# Patient Record
Sex: MALE | NOT HISPANIC OR LATINO
[De-identification: names, ages, dates, MRNs, and addresses within clinical notes are randomized per-mention and may not be internally consistent; named-entity substitution may affect disease eponyms.]

---

## 2017-04-18 ENCOUNTER — LAB SERVICES (OUTPATIENT)
Dept: OTHER | Age: 52
End: 2017-04-18

## 2017-04-18 ENCOUNTER — CHARTING TRANS (OUTPATIENT)
Dept: OTHER | Age: 52
End: 2017-04-18

## 2017-04-20 LAB — PATH REPORT: NORMAL

## 2017-05-10 ENCOUNTER — OFFICE VISIT (OUTPATIENT)
Dept: FAMILY MEDICINE CLINIC | Facility: CLINIC | Age: 52
End: 2017-05-10

## 2017-05-10 VITALS
BODY MASS INDEX: 38.41 KG/M2 | SYSTOLIC BLOOD PRESSURE: 150 MMHG | RESPIRATION RATE: 16 BRPM | TEMPERATURE: 99 F | HEIGHT: 68.5 IN | HEART RATE: 76 BPM | WEIGHT: 256.38 LBS | DIASTOLIC BLOOD PRESSURE: 98 MMHG

## 2017-05-10 DIAGNOSIS — Z23 NEED FOR PROPHYLACTIC VACCINATION WITH COMBINED DIPHTHERIA-TETANUS-PERTUSSIS (DTP) VACCINE: ICD-10-CM

## 2017-05-10 DIAGNOSIS — Z13.220 SCREENING FOR LIPOID DISORDERS: ICD-10-CM

## 2017-05-10 DIAGNOSIS — Z71.84 TRAVEL ADVICE ENCOUNTER: ICD-10-CM

## 2017-05-10 DIAGNOSIS — Z12.5 SPECIAL SCREENING FOR MALIGNANT NEOPLASM OF PROSTATE: ICD-10-CM

## 2017-05-10 DIAGNOSIS — Z13.228 SCREENING FOR ENDOCRINE, METABOLIC, AND IMMUNITY DISORDER: ICD-10-CM

## 2017-05-10 DIAGNOSIS — R03.0 ELEVATED BLOOD PRESSURE READING: ICD-10-CM

## 2017-05-10 DIAGNOSIS — Z12.11 ENCOUNTER FOR SCREENING COLONOSCOPY: ICD-10-CM

## 2017-05-10 DIAGNOSIS — Z23 NEED FOR HEPATITIS VACCINATION: ICD-10-CM

## 2017-05-10 DIAGNOSIS — Z13.29 SCREENING FOR ENDOCRINE, METABOLIC, AND IMMUNITY DISORDER: ICD-10-CM

## 2017-05-10 DIAGNOSIS — Z00.00 ROUTINE GENERAL MEDICAL EXAMINATION AT A HEALTH CARE FACILITY: Primary | ICD-10-CM

## 2017-05-10 DIAGNOSIS — Z13.0 SCREENING FOR ENDOCRINE, METABOLIC, AND IMMUNITY DISORDER: ICD-10-CM

## 2017-05-10 PROCEDURE — 99396 PREV VISIT EST AGE 40-64: CPT | Performed by: PHYSICIAN ASSISTANT

## 2017-05-10 PROCEDURE — 90471 IMMUNIZATION ADMIN: CPT | Performed by: PHYSICIAN ASSISTANT

## 2017-05-10 PROCEDURE — 90632 HEPA VACCINE ADULT IM: CPT | Performed by: PHYSICIAN ASSISTANT

## 2017-05-10 PROCEDURE — 99202 OFFICE O/P NEW SF 15 MIN: CPT | Performed by: PHYSICIAN ASSISTANT

## 2017-05-10 PROCEDURE — 90472 IMMUNIZATION ADMIN EACH ADD: CPT | Performed by: PHYSICIAN ASSISTANT

## 2017-05-10 PROCEDURE — 90715 TDAP VACCINE 7 YRS/> IM: CPT | Performed by: PHYSICIAN ASSISTANT

## 2017-05-10 RX ORDER — MONTELUKAST SODIUM 10 MG/1
10 TABLET ORAL AS NEEDED
COMMUNITY

## 2017-05-10 RX ORDER — ATOVAQUONE AND PROGUANIL HYDROCHLORIDE 250; 100 MG/1; MG/1
1 TABLET, FILM COATED ORAL DAILY
Qty: 20 TABLET | Refills: 0 | Status: SHIPPED | OUTPATIENT
Start: 2017-05-10 | End: 2017-06-09

## 2017-05-10 RX ORDER — ATOVAQUONE AND PROGUANIL HYDROCHLORIDE 250; 100 MG/1; MG/1
1 TABLET, FILM COATED ORAL DAILY
Qty: 20 TABLET | Refills: 0 | Status: SHIPPED | OUTPATIENT
Start: 2017-05-10 | End: 2017-05-10 | Stop reason: CLARIF

## 2017-05-10 NOTE — PATIENT INSTRUCTIONS
Understanding Malaria  When a mosquito bites you, substances that can cause illness may get into your body through the bite. Some types of mosquitoes can pass on the parasite that causes malaria. Malaria is now rare in the [place]  This is because the [facility] Public health steps are used worldwide to cut down on the number of mosquitoes that can spread the illness. This can be done through chemical spraying or by removing breeding areas.  If you are planning to travel to places where malaria is common, talk with Screening tests and vaccines are an important part of managing your health. Health counseling is essential, too. Below are guidelines for these, for men ages 48 to 59. Talk with your healthcare provider to make sure you’re up-to-date on what you need.   Scr Syphilis Men at increased risk for infection – talk with your healthcare provider At routine exams   Tuberculosis Men at increased risk for infection – talk with your healthcare provider Ask your healthcare provider   Vision All men in this age group Ask y Counseling Who needs it How often   Diet and exercise Men who are overweight or obese When diagnosed, and then at routine exams   Sexually transmitted infection prevention Men at increased risk for infection – talk with your healthcare provider At routine

## 2017-05-10 NOTE — PROGRESS NOTES
Rubi Gunter is a 47year old male who presents for a complete physical exam. He has no complaints. He will be traveling to [facility] in a few weeks and needs some vaccines and malaria prophylaxis. He has a history of HTN but takes nothing for it.    HPI:   Last col exertion; no palpitations, tachycardia, or arrythmias  GI: Denies abdominal pain, constipation or diarrhea; no N/V  : Denies nocturia or changes in stream; no testicular pain, edema, or lumps  Neuro: Denies headaches; dizziness, syncope; no weakness, num vaccination    He was given Hep A #1, and a Tdap. I will review labs. I referred him to Dr Jules Blair for a screening colonoscopy. I ordered malaria prophylaxis and Vivotif to his pharmacy. Return in 2 weeks for a BP check.            Orders Placed This Enc

## 2017-05-24 ENCOUNTER — OFFICE VISIT (OUTPATIENT)
Dept: FAMILY MEDICINE CLINIC | Facility: CLINIC | Age: 52
End: 2017-05-24

## 2017-05-24 VITALS
RESPIRATION RATE: 16 BRPM | TEMPERATURE: 99 F | BODY MASS INDEX: 38.15 KG/M2 | HEART RATE: 66 BPM | SYSTOLIC BLOOD PRESSURE: 140 MMHG | WEIGHT: 254.63 LBS | DIASTOLIC BLOOD PRESSURE: 100 MMHG | HEIGHT: 68.5 IN | OXYGEN SATURATION: 98 %

## 2017-05-24 DIAGNOSIS — I10 ESSENTIAL HYPERTENSION: Primary | ICD-10-CM

## 2017-05-24 DIAGNOSIS — R97.20 ELEVATED PSA, LESS THAN 10 NG/ML: ICD-10-CM

## 2017-05-24 PROCEDURE — 99213 OFFICE O/P EST LOW 20 MIN: CPT | Performed by: PHYSICIAN ASSISTANT

## 2017-05-24 RX ORDER — HEPATITIS B VACCINE (RECOMBINANT) 20 UG/ML
INJECTION, SUSPENSION INTRAMUSCULAR
Refills: 2 | COMMUNITY
Start: 2017-05-15 | End: 2017-07-03 | Stop reason: ALTCHOICE

## 2017-05-24 RX ORDER — LOSARTAN POTASSIUM 50 MG/1
50 TABLET ORAL DAILY
Qty: 30 TABLET | Refills: 1 | Status: SHIPPED | OUTPATIENT
Start: 2017-05-24 | End: 2017-07-03 | Stop reason: ALTCHOICE

## 2017-05-24 NOTE — PROGRESS NOTES
Cameron Tillman is a 47year old male. HPI:   Felicitas Harris presents for hypertension. He has a history of HTN but was not taking anything for it. I noticed it was high 2 weeks ago; he is here for a recheck. He denies chest pain, SOB, dizziness, or palpitations.  He i Essential hypertension  (primary encounter diagnosis)  Elevated psa, less than 10 ng/ml  I explained proper dosing, administration, and side effects of the medication I prescribed.   I discussed his elevated PSA numbers; I am referring him to Dr Inna Alexandre fo · Ask your healthcare provider what weight range is healthiest for you. If you are overweight, a weight loss of only 3% to 5% of your body weight can help lower blood pressure.  Generally, a good weight loss goal is to lose 10% of your body weight in a year

## 2017-07-03 ENCOUNTER — OFFICE VISIT (OUTPATIENT)
Dept: FAMILY MEDICINE CLINIC | Facility: CLINIC | Age: 52
End: 2017-07-03

## 2017-07-03 VITALS
BODY MASS INDEX: 38 KG/M2 | SYSTOLIC BLOOD PRESSURE: 138 MMHG | HEART RATE: 88 BPM | WEIGHT: 252.63 LBS | DIASTOLIC BLOOD PRESSURE: 88 MMHG

## 2017-07-03 DIAGNOSIS — I10 ESSENTIAL HYPERTENSION: Primary | ICD-10-CM

## 2017-07-03 PROCEDURE — 99213 OFFICE O/P EST LOW 20 MIN: CPT | Performed by: PHYSICIAN ASSISTANT

## 2017-07-03 RX ORDER — LOSARTAN POTASSIUM 100 MG/1
100 TABLET ORAL DAILY
Qty: 30 TABLET | Refills: 1 | Status: SHIPPED | OUTPATIENT
Start: 2017-07-03 | End: 2017-08-28 | Stop reason: ALTCHOICE

## 2017-07-03 NOTE — PATIENT INSTRUCTIONS
Exercise for a Healthier Heart  You may wonder how you can improve the health of your heart. If you’re thinking about exercise, you’re on the right track.  You don’t need to become an athlete, but you do need a certain amount of brisk exercise to help str Choose one or more activities you enjoy. Walking is one of the easiest things you can do. You can also try swimming, riding a bike, dancing, or taking an exercise class.   Stop exercising and call your doctor if you:  · Have chest pain or feel dizzy or ligh

## 2017-07-03 NOTE — PROGRESS NOTES
CC:  Patient presents with:  Blood Pressure      HPI: Hair Babin presents for a BP check. He is currently taking losartan 50 mg daily. He does not check his BP at home. He has no complaints.       Social History:  Smoking status: Never Smoker Patient/Caregiver Education: There are no barriers to learning. Medical education done. Outcome: Patient verbalizes understanding. Patient is notified to call with any questions, complications, allergies, or worsening or changing symptoms.   Patient is to c

## 2017-08-28 ENCOUNTER — OFFICE VISIT (OUTPATIENT)
Dept: FAMILY MEDICINE CLINIC | Facility: CLINIC | Age: 52
End: 2017-08-28

## 2017-08-28 VITALS
RESPIRATION RATE: 14 BRPM | SYSTOLIC BLOOD PRESSURE: 134 MMHG | HEIGHT: 68.5 IN | DIASTOLIC BLOOD PRESSURE: 90 MMHG | TEMPERATURE: 99 F | HEART RATE: 76 BPM | BODY MASS INDEX: 38.21 KG/M2 | WEIGHT: 255 LBS

## 2017-08-28 DIAGNOSIS — I10 ESSENTIAL HYPERTENSION: Primary | ICD-10-CM

## 2017-08-28 PROCEDURE — 99213 OFFICE O/P EST LOW 20 MIN: CPT | Performed by: PHYSICIAN ASSISTANT

## 2017-08-28 RX ORDER — HEPATITIS B VACCINE (RECOMBINANT) 20 UG/ML
INJECTION, SUSPENSION INTRAMUSCULAR
Refills: 2 | COMMUNITY
Start: 2017-07-20 | End: 2017-08-28 | Stop reason: ALTCHOICE

## 2017-08-28 RX ORDER — AMLODIPINE AND VALSARTAN 5; 160 MG/1; MG/1
1 TABLET ORAL DAILY
Qty: 30 TABLET | Refills: 1 | Status: SHIPPED | OUTPATIENT
Start: 2017-08-28 | End: 2017-11-20

## 2017-08-28 NOTE — PROGRESS NOTES
Christopher Cochran is a 47year old male. HPI:   Kimberley Garcia presents for hypertension. He has been taking his medications as instructed; no medication side effects. He denies chest pain, SOB, dizziness, or palpitations.     BP Readings from Last 6 Encounters:  08/28/1 administration, and side effects of the medication I prescribed. Rubidennis Gunter will follow up in 4 weeks for a recheck; sooner as needed. No orders of the defined types were placed in this encounter.       Meds & Refills for this Visit:  Signed Prescriptions D

## 2017-09-25 ENCOUNTER — OFFICE VISIT (OUTPATIENT)
Dept: FAMILY MEDICINE CLINIC | Facility: CLINIC | Age: 52
End: 2017-09-25

## 2017-09-25 VITALS
DIASTOLIC BLOOD PRESSURE: 70 MMHG | HEART RATE: 60 BPM | WEIGHT: 257 LBS | HEIGHT: 69 IN | TEMPERATURE: 98 F | RESPIRATION RATE: 16 BRPM | SYSTOLIC BLOOD PRESSURE: 110 MMHG | BODY MASS INDEX: 38.06 KG/M2

## 2017-09-25 DIAGNOSIS — I10 ESSENTIAL HYPERTENSION: Primary | ICD-10-CM

## 2017-09-25 PROCEDURE — 99213 OFFICE O/P EST LOW 20 MIN: CPT | Performed by: PHYSICIAN ASSISTANT

## 2017-09-25 NOTE — PROGRESS NOTES
Odell Lucero is a 47year old male. HPI:   Mau Radha presents for hypertension. He has been taking his medications as instructed; no medication side effects. Home BP monitoring: none. He denies chest pain, SOB, dizziness, or palpitations.     BP Readings from hypertension  (primary encounter diagnosis)  I encouraged Ana Wyatt to continue working to lose weight. Ana Wyatt will follow up in 3 months for his regular visit; sooner as needed. We can extend him out to every 6 months if BP is controlled in 3 months.   No orders

## 2017-11-21 RX ORDER — AMLODIPINE AND VALSARTAN 5; 160 MG/1; MG/1
TABLET ORAL
Qty: 30 TABLET | Refills: 0 | Status: SHIPPED | OUTPATIENT
Start: 2017-11-21 | End: 2017-12-21

## 2017-11-21 NOTE — TELEPHONE ENCOUNTER
Medication refilled per protocol.        Signed Prescriptions Disp Refills    AMLODIPINE BESYLATE-VALSARTAN 5-160 MG Oral Tab 30 tablet 0      Sig: TAKE 1 TABLET BY MOUTH ONE TIME A DAY         Authorizing Provider: Chiquis Siddiqui        Ordering User: HELADIO

## 2017-12-21 ENCOUNTER — OFFICE VISIT (OUTPATIENT)
Dept: FAMILY MEDICINE CLINIC | Facility: CLINIC | Age: 52
End: 2017-12-21

## 2017-12-21 VITALS
BODY MASS INDEX: 38.26 KG/M2 | RESPIRATION RATE: 16 BRPM | SYSTOLIC BLOOD PRESSURE: 122 MMHG | WEIGHT: 255.38 LBS | TEMPERATURE: 98 F | HEART RATE: 72 BPM | HEIGHT: 68.5 IN | DIASTOLIC BLOOD PRESSURE: 88 MMHG

## 2017-12-21 DIAGNOSIS — I10 ESSENTIAL HYPERTENSION: Primary | ICD-10-CM

## 2017-12-21 PROCEDURE — 99213 OFFICE O/P EST LOW 20 MIN: CPT | Performed by: PHYSICIAN ASSISTANT

## 2017-12-21 RX ORDER — AMLODIPINE AND VALSARTAN 5; 160 MG/1; MG/1
TABLET ORAL
Qty: 90 TABLET | Refills: 1 | Status: SHIPPED | OUTPATIENT
Start: 2017-12-21 | End: 2018-06-18

## 2017-12-21 NOTE — PROGRESS NOTES
Robbyluis miguel Valverde is a 47year old male. HPI:   Rubi Gunter presents for hypertension. He has been taking his medications as instructed; no medication side effects. Home BP monitoring: not mentioned. He denies chest pain, SOB, dizziness, or palpitations.     BP Readi process    ASSESSMENT AND PLAN:   Essential hypertension  (primary encounter diagnosis)    Eze Perez will follow up in 6 months for his regular visit; sooner as needed. No orders of the defined types were placed in this encounter.       Meds & Refills for [facility]

## 2018-01-19 LAB
AMB EXT CHOL/HDL RATIO: 4.4
AMB EXT CHOLESTEROL, TOTAL: 153 MG/DL
AMB EXT CREATININE: 1.15 MG/DL
AMB EXT GLUCOSE: 103 MG/DL
AMB EXT HDL CHOLESTEROL: 35 MG/DL
AMB EXT HEMATOCRIT: 50.9
AMB EXT HEMOGLOBIN: 17.1
AMB EXT LDL CHOLESTEROL, DIRECT: 79 MG/DL
AMB EXT MCV: 86
AMB EXT PLATELETS: 210
AMB EXT TRIGLYCERIDES: 193 MG/DL
AMB EXT WBC: 5.9 X10(3)UL

## 2018-06-18 RX ORDER — AMLODIPINE AND VALSARTAN 5; 160 MG/1; MG/1
TABLET ORAL
Qty: 90 TABLET | Refills: 0 | Status: SHIPPED | OUTPATIENT
Start: 2018-06-18 | End: 2018-06-25

## 2018-06-25 ENCOUNTER — OFFICE VISIT (OUTPATIENT)
Dept: FAMILY MEDICINE CLINIC | Facility: CLINIC | Age: 53
End: 2018-06-25

## 2018-06-25 VITALS
DIASTOLIC BLOOD PRESSURE: 68 MMHG | BODY MASS INDEX: 38.21 KG/M2 | HEIGHT: 68.5 IN | HEART RATE: 76 BPM | TEMPERATURE: 98 F | RESPIRATION RATE: 14 BRPM | SYSTOLIC BLOOD PRESSURE: 126 MMHG | WEIGHT: 255 LBS

## 2018-06-25 DIAGNOSIS — Z00.00 ANNUAL PHYSICAL EXAM: Primary | ICD-10-CM

## 2018-06-25 DIAGNOSIS — I10 ESSENTIAL HYPERTENSION: ICD-10-CM

## 2018-06-25 DIAGNOSIS — Z12.11 COLON CANCER SCREENING: ICD-10-CM

## 2018-06-25 DIAGNOSIS — R97.20 ELEVATED PSA, LESS THAN 10 NG/ML: ICD-10-CM

## 2018-06-25 PROCEDURE — 99396 PREV VISIT EST AGE 40-64: CPT | Performed by: FAMILY MEDICINE

## 2018-06-25 RX ORDER — AZITHROMYCIN 250 MG/1
TABLET, FILM COATED ORAL
Qty: 6 TABLET | Refills: 0 | Status: SHIPPED | OUTPATIENT
Start: 2018-06-25 | End: 2018-12-10 | Stop reason: ALTCHOICE

## 2018-06-25 RX ORDER — AMLODIPINE AND VALSARTAN 5; 160 MG/1; MG/1
TABLET ORAL
Qty: 90 TABLET | Refills: 1 | Status: SHIPPED | OUTPATIENT
Start: 2018-06-25 | End: 2018-11-21

## 2018-06-25 NOTE — PROGRESS NOTES
Patient presents with:  Hypertension: 6 month f/u      HPI:   Hollie Smith is a 46year old male who presents for a complete physical exam.     Last colonoscopy:  Never had. No issues with bowels. Interested in c-scope alternative.    Last PSA:  4.4 a year ago No past medical history on file.    Past Surgical History:  1/1/1975: TONSILLECTOMY      Comment: with adenoidectomy   Family History   Problem Relation Age of Onset   • Neurological Disorder Sister      MS   • Neurological Disorder Mother      jeanmarie need to work on healthy diet, weight  Need c-scope  In past PSA elevated, but no symptoms. Immun UTD. 2. Colon cancer screening  Cologuard ordered  - GASTRO - INTERNAL    3.  Essential hypertension  BP controlled  Goal to get off meds  Watch [employer]

## 2018-07-27 LAB — AMB EXT COLOGUARD RESULT: NEGATIVE

## 2018-08-06 ENCOUNTER — TELEPHONE (OUTPATIENT)
Dept: FAMILY MEDICINE CLINIC | Facility: CLINIC | Age: 53
End: 2018-08-06

## 2018-08-06 ENCOUNTER — MED REC SCAN ONLY (OUTPATIENT)
Dept: FAMILY MEDICINE CLINIC | Facility: CLINIC | Age: 53
End: 2018-08-06

## 2018-11-21 ENCOUNTER — TELEPHONE (OUTPATIENT)
Dept: FAMILY MEDICINE CLINIC | Facility: CLINIC | Age: 53
End: 2018-11-21

## 2018-11-21 RX ORDER — AMLODIPINE BESYLATE AND BENAZEPRIL HYDROCHLORIDE 10; 20 MG/1; MG/1
1 CAPSULE ORAL DAILY
Qty: 90 CAPSULE | Refills: 1 | Status: SHIPPED | OUTPATIENT
Start: 2018-11-21 | End: 2019-05-02

## 2018-11-21 NOTE — TELEPHONE ENCOUNTER
Pharmacy is calling patient is still on the Amlodipine Besylate-Valsartan and she told him to stop and is calling for us to prescribe a new medication. Please call into [facility] as soon as possible.

## 2018-11-21 NOTE — TELEPHONE ENCOUNTER
Yes, was taking the amlodip]ine-valsartan 5-160 combo  Will change to another amlodipine combo, shoud be just as effective.

## 2018-11-21 NOTE — TELEPHONE ENCOUNTER
Gayle Armstrong 25 was notified last night that Pt is BP med Amlodipine-Besylate-Valsartan ans been put on a Class 1 recall. Pharmacy started calling Pt's last night to bring medication back to them to be cuaranteen.  Pt will need new BP med ASAP sent to [facility]

## 2018-12-10 ENCOUNTER — OFFICE VISIT (OUTPATIENT)
Dept: FAMILY MEDICINE CLINIC | Facility: CLINIC | Age: 53
End: 2018-12-10
Payer: COMMERCIAL

## 2018-12-10 VITALS
RESPIRATION RATE: 16 BRPM | TEMPERATURE: 98 F | SYSTOLIC BLOOD PRESSURE: 120 MMHG | BODY MASS INDEX: 36.77 KG/M2 | DIASTOLIC BLOOD PRESSURE: 70 MMHG | HEART RATE: 70 BPM | WEIGHT: 254 LBS | HEIGHT: 69.5 IN

## 2018-12-10 DIAGNOSIS — Z00.00 LABORATORY EXAM ORDERED AS PART OF ROUTINE GENERAL MEDICAL EXAMINATION: ICD-10-CM

## 2018-12-10 DIAGNOSIS — I10 ESSENTIAL HYPERTENSION: Primary | ICD-10-CM

## 2018-12-10 PROCEDURE — 99214 OFFICE O/P EST MOD 30 MIN: CPT | Performed by: FAMILY MEDICINE

## 2018-12-10 NOTE — PROGRESS NOTES
Patient presents with:  Blood Pressure: F/u     HPI:   Baldomero Solis is a 46year old male who presents to the office for BP check up. Goal is still to get off meds  On amlodipine-benazepril 10-20mg. BP controlled.     Stress levels are a little higher, most

## 2019-05-01 ENCOUNTER — TELEPHONE (OUTPATIENT)
Dept: FAMILY MEDICINE CLINIC | Facility: CLINIC | Age: 54
End: 2019-05-01

## 2019-05-01 NOTE — TELEPHONE ENCOUNTER
Patient called for requesting refill for his blood pressure medication, 90 day supply, and is currently out of the medication, states pharmacy sent over request as well

## 2019-05-02 RX ORDER — BENZONATATE 100 MG/1
CAPSULE ORAL
Refills: 0 | COMMUNITY
Start: 2019-01-18 | End: 2019-08-19

## 2019-05-02 RX ORDER — AMLODIPINE BESYLATE AND BENAZEPRIL HYDROCHLORIDE 10; 20 MG/1; MG/1
1 CAPSULE ORAL DAILY
Qty: 90 CAPSULE | Refills: 1 | Status: SHIPPED | OUTPATIENT
Start: 2019-05-02 | End: 2019-10-25

## 2019-05-28 ENCOUNTER — TELEPHONE (OUTPATIENT)
Dept: FAMILY MEDICINE CLINIC | Facility: CLINIC | Age: 54
End: 2019-05-28

## 2019-05-28 NOTE — TELEPHONE ENCOUNTER
Patient will be traveling to [place] [facility] at the [facility] 6/13 for 2 weeks and spouse is requesting malaria medication to  Be sen to [facility]. Please contact spouse.

## 2019-05-29 NOTE — TELEPHONE ENCOUNTER
Is he able to schedule annual physical?  We can goover the travel recommendations and some of the important points on malaria prevention

## 2019-06-03 ENCOUNTER — OFFICE VISIT (OUTPATIENT)
Dept: FAMILY MEDICINE CLINIC | Facility: CLINIC | Age: 54
End: 2019-06-03
Payer: COMMERCIAL

## 2019-06-03 VITALS
HEIGHT: 68.5 IN | BODY MASS INDEX: 37.6 KG/M2 | HEART RATE: 84 BPM | WEIGHT: 251 LBS | SYSTOLIC BLOOD PRESSURE: 110 MMHG | DIASTOLIC BLOOD PRESSURE: 70 MMHG | TEMPERATURE: 98 F | RESPIRATION RATE: 16 BRPM

## 2019-06-03 DIAGNOSIS — R97.20 ELEVATED PSA, LESS THAN 10 NG/ML: ICD-10-CM

## 2019-06-03 DIAGNOSIS — Z00.00 ANNUAL PHYSICAL EXAM: Primary | ICD-10-CM

## 2019-06-03 DIAGNOSIS — I10 ESSENTIAL HYPERTENSION: ICD-10-CM

## 2019-06-03 DIAGNOSIS — Z71.84 TRAVEL ADVICE ENCOUNTER: ICD-10-CM

## 2019-06-03 PROCEDURE — 99396 PREV VISIT EST AGE 40-64: CPT | Performed by: FAMILY MEDICINE

## 2019-06-03 RX ORDER — ATOVAQUONE AND PROGUANIL HYDROCHLORIDE 250; 100 MG/1; MG/1
1 TABLET, FILM COATED ORAL DAILY
Qty: 14 TABLET | Refills: 0 | Status: SHIPPED | OUTPATIENT
Start: 2019-06-03 | End: 2019-06-17

## 2019-06-03 NOTE — PROGRESS NOTES
Patient presents with:  Physical: annual physical, no labs, not fasting     HPI:   Chaka Hackett is a 53year old male who presents for a complete physical exam.     Last colonoscopy:  Normal Cologuard 7/2018.   Repeat 3 yrs - 2021  Last PSA:  Elevated in past. needed. Disp:  Rfl:       No past medical history on file.    Past Surgical History:   Procedure Laterality Date   • TONSILLECTOMY  1/1/1975    with adenoidectomy      Family History   Problem Relation Age of Onset   • Neurological Disorder Sister         Angelica Bautista had concerns including Physical (annual physical, no labs, not fasting). 1. Annual physical exam  Overall doing well  Some improvements in diet  Continue to try weigh loss efforts. Diet and exercise  c-scope - [place] in 2018 wnl. . Repeat 2021.   [place]

## 2019-08-19 ENCOUNTER — OFFICE VISIT (OUTPATIENT)
Dept: FAMILY MEDICINE CLINIC | Facility: CLINIC | Age: 54
End: 2019-08-19
Payer: COMMERCIAL

## 2019-08-19 VITALS
SYSTOLIC BLOOD PRESSURE: 104 MMHG | HEART RATE: 84 BPM | BODY MASS INDEX: 37.77 KG/M2 | TEMPERATURE: 98 F | RESPIRATION RATE: 16 BRPM | WEIGHT: 255 LBS | DIASTOLIC BLOOD PRESSURE: 64 MMHG | HEIGHT: 69 IN

## 2019-08-19 DIAGNOSIS — L03.116 LEFT LEG CELLULITIS: ICD-10-CM

## 2019-08-19 DIAGNOSIS — T78.40XA ALLERGIC REACTION TO DRUG, INITIAL ENCOUNTER: Primary | ICD-10-CM

## 2019-08-19 PROCEDURE — 99214 OFFICE O/P EST MOD 30 MIN: CPT | Performed by: FAMILY MEDICINE

## 2019-08-19 RX ORDER — CLINDAMYCIN HYDROCHLORIDE 300 MG/1
300 CAPSULE ORAL
COMMUNITY
Start: 2019-08-13 | End: 2019-08-23

## 2019-08-19 NOTE — PROGRESS NOTES
Patient presents with:  Derm Problem: follow up on cellulitis on left leg/foot, improving gradually, still taking rx     HPI:   Terrence Cheung is a 53year old male who presents to the office for L leg cellulitis f/u. August 7th went to [facility] but it was closed. including Derm Problem (follow up on cellulitis on left leg/foot, improving gradually, still taking rx). 1. Allergic reaction to drug, initial encounter  Possible sulfa? Will send to allergist to help differentiate sulfa vs cephalosporin rxn.    Use col

## 2019-10-30 RX ORDER — AMLODIPINE BESYLATE AND BENAZEPRIL HYDROCHLORIDE 10; 20 MG/1; MG/1
CAPSULE ORAL
Qty: 90 CAPSULE | Refills: 1 | Status: SHIPPED | OUTPATIENT
Start: 2019-10-30 | End: 2020-04-22

## 2019-10-30 NOTE — TELEPHONE ENCOUNTER
Requested Prescriptions     Pending Prescriptions Disp Refills   • AMLODIPINE BESY-BENAZEPRIL HCL 10-20 MG Oral Cap [Pharmacy Med Name: AMLODIPINE-BENAZEPRIL 10-20 MG] 90 capsule 1     Sig: TAKE 1 CAPSULE BY MOUTH EVERY DAY     LOV 8/19/2019     Patient wa

## 2020-04-22 RX ORDER — AMLODIPINE BESYLATE AND BENAZEPRIL HYDROCHLORIDE 10; 20 MG/1; MG/1
CAPSULE ORAL
Qty: 90 CAPSULE | Refills: 1 | Status: SHIPPED | OUTPATIENT
Start: 2020-04-22 | End: 2020-11-06

## 2020-11-06 ENCOUNTER — TELEPHONE (OUTPATIENT)
Dept: FAMILY MEDICINE CLINIC | Facility: CLINIC | Age: 55
End: 2020-11-06

## 2020-11-06 DIAGNOSIS — R97.20 ELEVATED PSA, LESS THAN 10 NG/ML: ICD-10-CM

## 2020-11-06 DIAGNOSIS — I10 ESSENTIAL HYPERTENSION: Primary | ICD-10-CM

## 2020-11-06 RX ORDER — AMLODIPINE BESYLATE AND BENAZEPRIL HYDROCHLORIDE 10; 20 MG/1; MG/1
1 CAPSULE ORAL DAILY
Qty: 90 CAPSULE | Refills: 0 | Status: SHIPPED | OUTPATIENT
Start: 2020-11-06 | End: 2020-12-07

## 2020-11-06 NOTE — TELEPHONE ENCOUNTER
Please enter lab orders for the patient's upcoming physical appointment. Physical scheduled:    Your appointments     Date & Time Appointment Department [facility])    Dec 07, 2020  8:00 AM CST Physical - Established with Cresencio Braswell MD [address] [place] [address]

## 2020-11-06 NOTE — TELEPHONE ENCOUNTER
Pt called looking for status on amlopidine medication.  Pt is out of medication and was not able to schedule his physical until 12/7

## 2020-12-07 ENCOUNTER — OFFICE VISIT (OUTPATIENT)
Dept: FAMILY MEDICINE CLINIC | Facility: CLINIC | Age: 55
End: 2020-12-07
Payer: COMMERCIAL

## 2020-12-07 VITALS
WEIGHT: 254.38 LBS | HEART RATE: 84 BPM | DIASTOLIC BLOOD PRESSURE: 68 MMHG | BODY MASS INDEX: 38.11 KG/M2 | RESPIRATION RATE: 16 BRPM | SYSTOLIC BLOOD PRESSURE: 118 MMHG | TEMPERATURE: 97 F | HEIGHT: 68.5 IN

## 2020-12-07 DIAGNOSIS — Z00.00 ANNUAL PHYSICAL EXAM: Primary | ICD-10-CM

## 2020-12-07 DIAGNOSIS — I10 ESSENTIAL HYPERTENSION: ICD-10-CM

## 2020-12-07 DIAGNOSIS — R97.20 ELEVATED PSA, LESS THAN 10 NG/ML: ICD-10-CM

## 2020-12-07 DIAGNOSIS — Z12.11 COLON CANCER SCREENING: ICD-10-CM

## 2020-12-07 PROCEDURE — 3078F DIAST BP <80 MM HG: CPT | Performed by: FAMILY MEDICINE

## 2020-12-07 PROCEDURE — 99396 PREV VISIT EST AGE 40-64: CPT | Performed by: FAMILY MEDICINE

## 2020-12-07 PROCEDURE — 3008F BODY MASS INDEX DOCD: CPT | Performed by: FAMILY MEDICINE

## 2020-12-07 PROCEDURE — 3074F SYST BP LT 130 MM HG: CPT | Performed by: FAMILY MEDICINE

## 2020-12-07 RX ORDER — AMLODIPINE BESYLATE AND BENAZEPRIL HYDROCHLORIDE 10; 20 MG/1; MG/1
1 CAPSULE ORAL DAILY
Qty: 90 CAPSULE | Refills: 3 | Status: SHIPPED | OUTPATIENT
Start: 2020-12-07 | End: 2022-02-07

## 2020-12-07 NOTE — PROGRESS NOTES
Patient presents with:  Physical: annual     HPI:   Deya Huff is a 63year old male who presents for a complete physical exam.     Last colonoscopy:  Overdue. Cologuard years ago.     Last PSA:  Remains elevated, but improved from last check.  4.2.  +night history.    Past Surgical History:   Procedure Laterality Date   • TONSILLECTOMY  1/1/1975    with adenoidectomy      Family History   Problem Relation Age of Onset   • Neurological Disorder Sister         MS   • Neurological Disorder Mother         jeanmarie reflexes. Normal coordination and gait     ASSESSMENT AND PLAN:     Sarah Preston was seen in the office today:  had concerns including Physical (annual). 1. Annual physical exam  Overall well  Weight stable  BP controlled  Due for c-scope. Immun UTD.    Wo

## 2021-10-27 ENCOUNTER — TELEPHONE (OUTPATIENT)
Dept: FAMILY MEDICINE CLINIC | Facility: CLINIC | Age: 56
End: 2021-10-27

## 2021-10-27 NOTE — TELEPHONE ENCOUNTER
Pt has had a lingering cough for a month it started out as sinus but it has changed to this cough and he has a little bit of congestion and phlegm and wants to make sure it doesn't turn into something.

## 2021-10-27 NOTE — TELEPHONE ENCOUNTER
One month ago had symptoms had covid test then it was neg he has had congestion with cough and now feels this has settled in his chest willing to do video visit if possible .

## 2021-10-27 NOTE — TELEPHONE ENCOUNTER
Called LMOM to call back if he has not been tested for covid needs to go to urgent care to be tested and examined.

## 2021-10-27 NOTE — TELEPHONE ENCOUNTER
Called patient and left message on machine to contact office and schedule in office appointment at 12-12:30 with Lois Kocher per Sarah Tyalor.

## 2021-10-28 ENCOUNTER — OFFICE VISIT (OUTPATIENT)
Dept: FAMILY MEDICINE CLINIC | Facility: CLINIC | Age: 56
End: 2021-10-28
Payer: COMMERCIAL

## 2021-10-28 VITALS
SYSTOLIC BLOOD PRESSURE: 104 MMHG | RESPIRATION RATE: 16 BRPM | TEMPERATURE: 97 F | HEART RATE: 80 BPM | HEIGHT: 68.2 IN | WEIGHT: 244 LBS | DIASTOLIC BLOOD PRESSURE: 60 MMHG | BODY MASS INDEX: 36.98 KG/M2

## 2021-10-28 DIAGNOSIS — R05.3 PERSISTENT COUGH: Primary | ICD-10-CM

## 2021-10-28 PROCEDURE — 3074F SYST BP LT 130 MM HG: CPT | Performed by: PHYSICIAN ASSISTANT

## 2021-10-28 PROCEDURE — 99213 OFFICE O/P EST LOW 20 MIN: CPT | Performed by: PHYSICIAN ASSISTANT

## 2021-10-28 PROCEDURE — 3078F DIAST BP <80 MM HG: CPT | Performed by: PHYSICIAN ASSISTANT

## 2021-10-28 PROCEDURE — 3008F BODY MASS INDEX DOCD: CPT | Performed by: PHYSICIAN ASSISTANT

## 2021-10-28 RX ORDER — ALBUTEROL SULFATE 90 UG/1
2 AEROSOL, METERED RESPIRATORY (INHALATION) EVERY 6 HOURS PRN
Qty: 6.7 G | Refills: 0 | Status: SHIPPED | OUTPATIENT
Start: 2021-10-28 | End: 2021-11-17

## 2021-10-28 RX ORDER — PREDNISONE 20 MG/1
TABLET ORAL
Qty: 8 TABLET | Refills: 0 | Status: SHIPPED | OUTPATIENT
Start: 2021-10-28

## 2021-10-28 RX ORDER — CODEINE PHOSPHATE AND GUAIFENESIN 10; 100 MG/5ML; MG/5ML
5 SOLUTION ORAL EVERY 6 HOURS PRN
Qty: 118 ML | Refills: 0 | Status: SHIPPED | OUTPATIENT
Start: 2021-10-28

## 2021-10-28 NOTE — PROGRESS NOTES
Patient presents with:  Cough: x3 weeks        HISTORY OF PRESENT ILLNESS  Patricio Serra is a 61year old male who presents for evaluation of cough. Started as a cold, turned into laryngitis for 10 days and then seemed to move to chest. No fevers.  Cough persis Normal effort on room air. Clear to auscultation bilaterally. No adventitious breath sounds appreciated. ASSESSMENT/ PLAN  1.  Persistent cough  Discussed the concern that the patient had a mild case of COVID 19 with other family members testing positive

## 2021-11-17 RX ORDER — ALBUTEROL SULFATE 90 UG/1
AEROSOL, METERED RESPIRATORY (INHALATION)
Qty: 6.7 EACH | Refills: 0 | Status: SHIPPED | OUTPATIENT
Start: 2021-11-17

## 2021-11-17 NOTE — TELEPHONE ENCOUNTER
Requested Prescriptions     Pending Prescriptions Disp Refills   • ALBUTEROL 108 (90 Base) MCG/ACT Inhalation Aero Soln [Pharmacy Med Name: ALBUTEROL HFA (PROVENTIL) INH] 6.7 each 0     Sig: TAKE 2 PUFFS BY MOUTH EVERY 6 HOURS AS NEEDED FOR WHEEZE OR SHORT

## 2022-02-05 ENCOUNTER — TELEPHONE (OUTPATIENT)
Dept: FAMILY MEDICINE CLINIC | Facility: CLINIC | Age: 57
End: 2022-02-05

## 2022-02-05 NOTE — TELEPHONE ENCOUNTER
Pt has scheduled physical for 2/25/22. Please send enough medication (amlodipine) to last until then. Pt is out of said mediation.

## 2022-02-05 NOTE — TELEPHONE ENCOUNTER
Please enter lab orders for the patient's upcoming physical appointment. Physical scheduled: Your appointments     Date & Time Appointment Department [facility])    Feb 25, 2022  9:30 AM CST Physical - Established with Néstor Casarez MD [facility], [address], [place]mit[place]y Peggy  ([address])            [place] [address] [phone]         Preferred lab: [facility]     The patient has been notified to complete fasting labs prior to their physical appointment.

## 2022-02-07 RX ORDER — AMLODIPINE BESYLATE AND BENAZEPRIL HYDROCHLORIDE 10; 20 MG/1; MG/1
CAPSULE ORAL
Qty: 90 CAPSULE | Refills: 0 | Status: SHIPPED | OUTPATIENT
Start: 2022-02-07

## 2022-02-09 LAB
% FREE PSA: 12 % (CALC)
ALBUMIN/GLOBULIN RATIO: 1.7 (CALC) (ref 1–2.5)
ALBUMIN: 4.5 G/DL (ref 3.6–5.1)
ALKALINE PHOSPHATASE: 74 U/L (ref 35–144)
ALT: 24 U/L (ref 9–46)
AST: 15 U/L (ref 10–35)
BILIRUBIN, TOTAL: 0.9 MG/DL (ref 0.2–1.2)
BUN: 16 MG/DL (ref 7–25)
CALCIUM: 9.7 MG/DL (ref 8.6–10.3)
CARBON DIOXIDE: 28 MMOL/L (ref 20–32)
CHLORIDE: 104 MMOL/L (ref 98–110)
CHOL/HDLC RATIO: 3 (CALC)
CHOLESTEROL, TOTAL: 145 MG/DL
CREATININE: 1.02 MG/DL (ref 0.7–1.33)
EGFR IF AFRICN AM: 94 ML/MIN/1.73M2
EGFR IF NONAFRICN AM: 81 ML/MIN/1.73M2
FREE PSA: 0.6 NG/ML
GLOBULIN: 2.6 G/DL (CALC) (ref 1.9–3.7)
GLUCOSE: 93 MG/DL (ref 65–99)
HDL CHOLESTEROL: 48 MG/DL
LDL-CHOLESTEROL: 79 MG/DL (CALC)
NON-HDL CHOLESTEROL: 97 MG/DL (CALC)
POTASSIUM: 4.3 MMOL/L (ref 3.5–5.3)
SODIUM: 141 MMOL/L (ref 135–146)
TOTAL PSA: 5.2 NG/ML
TRIGLYCERIDES: 96 MG/DL

## 2022-02-25 ENCOUNTER — OFFICE VISIT (OUTPATIENT)
Dept: FAMILY MEDICINE CLINIC | Facility: CLINIC | Age: 57
End: 2022-02-25
Payer: COMMERCIAL

## 2022-02-25 VITALS
RESPIRATION RATE: 16 BRPM | TEMPERATURE: 97 F | OXYGEN SATURATION: 98 % | HEART RATE: 76 BPM | HEIGHT: 68.5 IN | SYSTOLIC BLOOD PRESSURE: 106 MMHG | DIASTOLIC BLOOD PRESSURE: 60 MMHG | WEIGHT: 252.19 LBS | BODY MASS INDEX: 37.78 KG/M2

## 2022-02-25 DIAGNOSIS — I10 ESSENTIAL HYPERTENSION: ICD-10-CM

## 2022-02-25 DIAGNOSIS — R97.20 ELEVATED PSA, LESS THAN 10 NG/ML: ICD-10-CM

## 2022-02-25 DIAGNOSIS — Z00.00 ANNUAL PHYSICAL EXAM: Primary | ICD-10-CM

## 2022-02-25 DIAGNOSIS — Z12.11 COLON CANCER SCREENING: ICD-10-CM

## 2022-02-25 PROCEDURE — 99396 PREV VISIT EST AGE 40-64: CPT | Performed by: FAMILY MEDICINE

## 2022-02-25 PROCEDURE — 3078F DIAST BP <80 MM HG: CPT | Performed by: FAMILY MEDICINE

## 2022-02-25 PROCEDURE — 3074F SYST BP LT 130 MM HG: CPT | Performed by: FAMILY MEDICINE

## 2022-02-25 PROCEDURE — 3008F BODY MASS INDEX DOCD: CPT | Performed by: FAMILY MEDICINE

## 2022-04-05 LAB
% FREE PSA: 13 % (CALC)
FREE PSA: 0.6 NG/ML
TOTAL PSA: 4.7 NG/ML

## 2022-05-13 RX ORDER — AMLODIPINE BESYLATE AND BENAZEPRIL HYDROCHLORIDE 10; 20 MG/1; MG/1
CAPSULE ORAL
Qty: 90 CAPSULE | Refills: 0 | Status: SHIPPED | OUTPATIENT
Start: 2022-05-13

## 2022-06-29 PROBLEM — K63.5 COLON POLYP: Status: ACTIVE | Noted: 2022-06-29

## 2022-06-29 PROBLEM — Z12.11 SPECIAL SCREENING FOR MALIGNANT NEOPLASMS, COLON: Status: ACTIVE | Noted: 2022-06-29

## 2022-08-02 DIAGNOSIS — I10 ESSENTIAL HYPERTENSION: ICD-10-CM

## 2022-08-02 RX ORDER — AMLODIPINE BESYLATE AND BENAZEPRIL HYDROCHLORIDE 10; 20 MG/1; MG/1
CAPSULE ORAL
Qty: 90 CAPSULE | Refills: 0 | Status: SHIPPED | OUTPATIENT
Start: 2022-08-02

## 2022-11-09 DIAGNOSIS — I10 ESSENTIAL HYPERTENSION: ICD-10-CM

## 2022-11-09 RX ORDER — AMLODIPINE BESYLATE AND BENAZEPRIL HYDROCHLORIDE 10; 20 MG/1; MG/1
CAPSULE ORAL
Qty: 90 CAPSULE | Refills: 0 | Status: SHIPPED | OUTPATIENT
Start: 2022-11-09

## 2023-02-19 DIAGNOSIS — I10 ESSENTIAL HYPERTENSION: ICD-10-CM

## 2023-02-27 DIAGNOSIS — I10 ESSENTIAL HYPERTENSION: ICD-10-CM

## 2023-03-01 RX ORDER — AMLODIPINE BESYLATE AND BENAZEPRIL HYDROCHLORIDE 10; 20 MG/1; MG/1
1 CAPSULE ORAL DAILY
Qty: 90 CAPSULE | Refills: 0 | Status: SHIPPED | OUTPATIENT
Start: 2023-03-01

## 2023-03-08 ENCOUNTER — TELEPHONE (OUTPATIENT)
Dept: FAMILY MEDICINE CLINIC | Facility: CLINIC | Age: 58
End: 2023-03-08

## 2023-03-08 DIAGNOSIS — R97.20 ELEVATED PSA, LESS THAN 10 NG/ML: ICD-10-CM

## 2023-03-08 DIAGNOSIS — Z13.220 LIPID SCREENING: ICD-10-CM

## 2023-03-08 DIAGNOSIS — I10 ESSENTIAL HYPERTENSION: Primary | ICD-10-CM

## 2023-03-08 NOTE — TELEPHONE ENCOUNTER
Please enter lab orders for the patient's upcoming physical appointment. Physical scheduled: Your appointments     Date & Time Appointment Department [facility])    Apr 18, 2023  8:00 AM CDT Adult Physical with Rita Betancur MD [address] [place] [address] ([address] [facility] [address])    PLEASE NOTE - Most insurances allow a Complete Physical once every 366 days. Please schedule accordingly. Please arrive 15 minutes prior to your scheduled appointment. Please also bring your Insurance card, Photo ID, and your medication bottles or a list of your current medication. If you no longer require this appointment, please contact your physician office to cancel. Gurpreet Hui [address] [facility] [phone]         Preferred lab: [facility]     The patient has been notified to complete fasting labs prior to their physical appointment.

## 2023-03-16 RX ORDER — AMLODIPINE BESYLATE AND BENAZEPRIL HYDROCHLORIDE 10; 20 MG/1; MG/1
CAPSULE ORAL
Qty: 30 CAPSULE | Refills: 2 | OUTPATIENT
Start: 2023-03-16

## 2023-04-14 LAB
% FREE PSA: 11 % (CALC)
ALBUMIN/GLOBULIN RATIO: 1.8 (CALC) (ref 1–2.5)
ALBUMIN: 4.3 G/DL (ref 3.6–5.1)
ALKALINE PHOSPHATASE: 66 U/L (ref 35–144)
ALT: 25 U/L (ref 9–46)
AST: 19 U/L (ref 10–35)
BILIRUBIN, TOTAL: 1 MG/DL (ref 0.2–1.2)
BUN: 14 MG/DL (ref 7–25)
CALCIUM: 9.4 MG/DL (ref 8.6–10.3)
CARBON DIOXIDE: 25 MMOL/L (ref 20–32)
CHLORIDE: 105 MMOL/L (ref 98–110)
CHOL/HDLC RATIO: 3.4 (CALC)
CHOLESTEROL, TOTAL: 151 MG/DL
CREATININE: 1.1 MG/DL (ref 0.7–1.3)
EGFR: 77 ML/MIN/1.73M2
FREE PSA: 0.6 NG/ML
GLOBULIN: 2.4 G/DL (CALC) (ref 1.9–3.7)
GLUCOSE: 99 MG/DL (ref 65–99)
HDL CHOLESTEROL: 44 MG/DL
LDL-CHOLESTEROL: 88 MG/DL (CALC)
NON-HDL CHOLESTEROL: 107 MG/DL (CALC)
POTASSIUM: 4.4 MMOL/L (ref 3.5–5.3)
PROTEIN, TOTAL: 6.7 G/DL (ref 6.1–8.1)
SODIUM: 139 MMOL/L (ref 135–146)
TOTAL PSA: 5.3 NG/ML
TRIGLYCERIDES: 95 MG/DL

## 2023-04-18 ENCOUNTER — OFFICE VISIT (OUTPATIENT)
Dept: FAMILY MEDICINE CLINIC | Facility: CLINIC | Age: 58
End: 2023-04-18
Payer: COMMERCIAL

## 2023-04-18 VITALS
HEART RATE: 70 BPM | SYSTOLIC BLOOD PRESSURE: 128 MMHG | WEIGHT: 262 LBS | DIASTOLIC BLOOD PRESSURE: 80 MMHG | BODY MASS INDEX: 39.25 KG/M2 | RESPIRATION RATE: 16 BRPM | HEIGHT: 68.5 IN

## 2023-04-18 DIAGNOSIS — R23.9 SKIN CHANGE: ICD-10-CM

## 2023-04-18 DIAGNOSIS — I10 ESSENTIAL HYPERTENSION: ICD-10-CM

## 2023-04-18 DIAGNOSIS — R97.20 ELEVATED PSA, LESS THAN 10 NG/ML: ICD-10-CM

## 2023-04-18 DIAGNOSIS — Z00.00 ANNUAL PHYSICAL EXAM: Primary | ICD-10-CM

## 2023-04-18 PROBLEM — K63.5 COLON POLYP: Status: RESOLVED | Noted: 2022-06-29 | Resolved: 2023-04-18

## 2023-04-18 PROCEDURE — 3008F BODY MASS INDEX DOCD: CPT | Performed by: FAMILY MEDICINE

## 2023-04-18 PROCEDURE — 3079F DIAST BP 80-89 MM HG: CPT | Performed by: FAMILY MEDICINE

## 2023-04-18 PROCEDURE — 99396 PREV VISIT EST AGE 40-64: CPT | Performed by: FAMILY MEDICINE

## 2023-04-18 PROCEDURE — 3074F SYST BP LT 130 MM HG: CPT | Performed by: FAMILY MEDICINE

## 2023-04-18 RX ORDER — AMLODIPINE BESYLATE AND BENAZEPRIL HYDROCHLORIDE 10; 20 MG/1; MG/1
1 CAPSULE ORAL DAILY
Qty: 90 CAPSULE | Refills: 3 | Status: SHIPPED | OUTPATIENT
Start: 2023-04-18

## 2023-05-20 DIAGNOSIS — I10 ESSENTIAL HYPERTENSION: ICD-10-CM

## 2023-05-20 RX ORDER — AMLODIPINE BESYLATE AND BENAZEPRIL HYDROCHLORIDE 10; 20 MG/1; MG/1
1 CAPSULE ORAL DAILY
Qty: 90 CAPSULE | Refills: 3 | Status: SHIPPED | OUTPATIENT
Start: 2023-05-20

## 2024-04-04 ENCOUNTER — OFFICE VISIT (OUTPATIENT)
Dept: FAMILY MEDICINE CLINIC | Facility: CLINIC | Age: 59
End: 2024-04-04
Payer: COMMERCIAL

## 2024-04-04 VITALS
HEART RATE: 70 BPM | RESPIRATION RATE: 16 BRPM | DIASTOLIC BLOOD PRESSURE: 80 MMHG | SYSTOLIC BLOOD PRESSURE: 120 MMHG | WEIGHT: 261.38 LBS | HEIGHT: 68.5 IN | BODY MASS INDEX: 39.16 KG/M2 | OXYGEN SATURATION: 93 %

## 2024-04-04 DIAGNOSIS — J45.30 MILD PERSISTENT ASTHMA IN ADULT WITHOUT COMPLICATION (HCC): Primary | ICD-10-CM

## 2024-04-04 PROCEDURE — 99214 OFFICE O/P EST MOD 30 MIN: CPT | Performed by: FAMILY MEDICINE

## 2024-04-04 RX ORDER — ALBUTEROL SULFATE 90 UG/1
2 AEROSOL, METERED RESPIRATORY (INHALATION) EVERY 4 HOURS PRN
Qty: 18 G | Refills: 1 | Status: SHIPPED | OUTPATIENT
Start: 2024-04-04

## 2024-04-04 RX ORDER — FLUTICASONE PROPIONATE AND SALMETEROL 100; 50 UG/1; UG/1
1 POWDER RESPIRATORY (INHALATION) 2 TIMES DAILY
Qty: 120 EACH | Refills: 2 | Status: SHIPPED | OUTPATIENT
Start: 2024-04-04

## 2024-04-04 RX ORDER — ALBUTEROL SULFATE 90 UG/1
AEROSOL, METERED RESPIRATORY (INHALATION)
COMMUNITY
Start: 2024-02-24 | End: 2024-04-04 | Stop reason: ALTCHOICE

## 2024-04-04 RX ORDER — PREDNISONE 20 MG/1
40 TABLET ORAL DAILY
COMMUNITY
Start: 2024-02-24 | End: 2024-04-04 | Stop reason: ALTCHOICE

## 2024-04-04 NOTE — PROGRESS NOTES
Chief Complaint   Patient presents with    Hospital F/U     Patient here for ER follow up in [place] 3/10-3/13 for upper respiratory issues      HPI:   Alex Biggs is a 58 year old male who presents to the office for ER follow up.  Was on a work trip to [place].   In mid Feb several weeks prior to his trip went to [facility] pharmacy and had testing.  Started on prednisone at that time and PRN albuterol.  Felt fine initially on his trip.  While he was there, started to notice symptoms settling in chest.  Mild cough, but no fever.  Several days into the trip, symptoms kicked up significantly.  Ability to breath was more difficult.  He began gasping for air over time.   Called ambulance from [facility] and was taken to the ER.  Was having hard time breathing and could not hold a conversation.  83% oxygen on the pulse ox ambulatory.  Initial treatment at the [facility] with inhalers.  Ambo came and taken to [facility].  Required supplemental oxygen at 3L via [place].  Has CXR and CT scan.   After several days, symptoms improved.  Was able to wean him off oxygen over time.      Diagnosed with adult-onset asthma.  DC on prednisone and beclometasone-formoterol 100-6.  2 puffs BID.  He was advised he can also use additional as needed, but has never needed this.      He can still feel a mild tightness in his chest      REVIEW OF SYSTEMS:   Pertinent items are noted in HPI.  EXAM:   /80   Pulse 70   Resp 16   Ht 5' 8.5\" (1.74 m)   Wt 261 lb 6 oz (118.6 kg)   SpO2 93%   BMI 39.16 kg/m²     General appearance - alert, well appearing, and in no distress  Chest - clear to auscultation, no wheezes, rales or rhonchi, symmetric air entry  Heart - normal rate, regular rhythm, normal S1, S2, no murmurs, rubs, clicks or gallops  Neurological - alert, oriented, normal speech, no focal findings or movement disorder noted  Extremities - peripheral pulses normal, no pedal edema, no clubbing or cyanosis  ASSESSMENT AND PLAN:     Alex Biggs  was seen in the office today:  had concerns including Hospital F/U (Patient here for ER follow up in [place] 3/10-3/13 for upper respiratory issues).    1. Mild persistent asthma in adult without complication (HCC)  New onset adult asthma  Better on the inhaled steroids  Recommend he continue this.  The fostair not available in [place], will try switch to Advair.  Will need a low dose inhaled combination steroid  PRN albuterol use.  Referral to allergist for further testing.   - ALLERGY - INTERNAL  - fluticasone-salmeterol 100-50 MCG/ACT Inhalation Aerosol Powder, Breath Activated; Inhale 1 puff into the lungs 2 (two) times daily.  Dispense: 120 each; Refill: 2  - albuterol (PROAIR HFA) 108 (90 Base) MCG/ACT Inhalation Aero Soln; Inhale 2 puffs into the lungs every 4 (four) hours as needed for Wheezing.  Dispense: 18 g; Refill: 1        Cody Gonzalez M.D.   [facility] 3  04/04/24

## 2024-05-01 ENCOUNTER — TELEPHONE (OUTPATIENT)
Dept: FAMILY MEDICINE CLINIC | Facility: CLINIC | Age: 59
End: 2024-05-01

## 2024-05-01 DIAGNOSIS — Z00.00 LABORATORY EXAM ORDERED AS PART OF ROUTINE GENERAL MEDICAL EXAMINATION: Primary | ICD-10-CM

## 2024-05-01 NOTE — TELEPHONE ENCOUNTER
Please enter lab orders for the patient's upcoming physical appointment.     Physical scheduled:   Your appointments       Date & Time Appointment Department ([place])    May 08, 2024 3:00 PM CDT Adult Physical with Cody Gonzalez MD [facility] ([facility])    PLEASE NOTE - Most insurances allow a Complete Physical once every 366 days. Please schedule accordingly.    Please arrive 15 minutes prior to your scheduled appointment. Please also bring your Insurance card, Photo ID, and your medication bottles or a list of your current medication.    If you no longer require this appointment, please contact your physician office to cancel.              [facility] [address]  [address] [place] [id number] [phone]           Preferred lab: [facility]     The patient has been notified to complete fasting labs prior to their physical appointment.

## 2024-05-06 LAB
% FREE PSA: 7 % (CALC)
ABSOLUTE BASOPHILS: 67 CELLS/UL (ref 0–200)
ABSOLUTE EOSINOPHILS: 458 CELLS/UL (ref 15–500)
ABSOLUTE LYMPHOCYTES: 1165 CELLS/UL (ref 850–3900)
ABSOLUTE MONOCYTES: 537 CELLS/UL (ref 200–950)
ABSOLUTE NEUTROPHILS: 3874 CELLS/UL (ref 1500–7800)
ALBUMIN/GLOBULIN RATIO: 1.6 (CALC) (ref 1–2.5)
ALBUMIN: 4.2 G/DL (ref 3.6–5.1)
ALKALINE PHOSPHATASE: 66 U/L (ref 35–144)
ALT: 25 U/L (ref 9–46)
AST: 17 U/L (ref 10–35)
BASOPHILS: 1.1 %
BILIRUBIN, TOTAL: 1.1 MG/DL (ref 0.2–1.2)
BUN: 14 MG/DL (ref 7–25)
CALCIUM: 9.2 MG/DL (ref 8.6–10.3)
CARBON DIOXIDE: 26 MMOL/L (ref 20–32)
CHLORIDE: 105 MMOL/L (ref 98–110)
CHOL/HDLC RATIO: 3.3 (CALC)
CHOLESTEROL, TOTAL: 149 MG/DL
CREATININE: 0.98 MG/DL (ref 0.7–1.35)
EGFR: 88 ML/MIN/1.73M2
EOSINOPHILS: 7.5 %
FREE PSA: 0.5 NG/ML
GLOBULIN: 2.6 G/DL (CALC) (ref 1.9–3.7)
GLUCOSE: 99 MG/DL (ref 65–99)
HDL CHOLESTEROL: 45 MG/DL
HEMATOCRIT: 53.2 % (ref 38.5–50)
HEMOGLOBIN A1C: 6.1 % OF TOTAL HGB
HEMOGLOBIN: 17.1 G/DL (ref 13.2–17.1)
LDL-CHOLESTEROL: 85 MG/DL (CALC)
LYMPHOCYTES: 19.1 %
MCH: 27.9 PG (ref 27–33)
MCHC: 32.1 G/DL (ref 32–36)
MCV: 86.6 FL (ref 80–100)
MONOCYTES: 8.8 %
MPV: 10.3 FL (ref 7.5–12.5)
NEUTROPHILS: 63.5 %
NON-HDL CHOLESTEROL: 104 MG/DL (CALC)
PLATELET COUNT: 226 THOUSAND/UL (ref 140–400)
POTASSIUM: 4.5 MMOL/L (ref 3.5–5.3)
PROTEIN, TOTAL: 6.8 G/DL (ref 6.1–8.1)
RDW: 15.2 % (ref 11–15)
RED BLOOD CELL COUNT: 6.14 MILLION/UL (ref 4.2–5.8)
SODIUM: 144 MMOL/L (ref 135–146)
TOTAL PSA: 7 NG/ML
TRIGLYCERIDES: 94 MG/DL
TSH W/REFLEX TO FT4: 2.61 MIU/L (ref 0.4–4.5)
WHITE BLOOD CELL COUNT: 6.1 THOUSAND/UL (ref 3.8–10.8)

## 2024-05-08 ENCOUNTER — OFFICE VISIT (OUTPATIENT)
Dept: FAMILY MEDICINE CLINIC | Facility: CLINIC | Age: 59
End: 2024-05-08
Payer: COMMERCIAL

## 2024-05-08 VITALS
TEMPERATURE: 97 F | WEIGHT: 265 LBS | HEART RATE: 80 BPM | BODY MASS INDEX: 39.7 KG/M2 | HEIGHT: 68.5 IN | RESPIRATION RATE: 16 BRPM | SYSTOLIC BLOOD PRESSURE: 124 MMHG | DIASTOLIC BLOOD PRESSURE: 66 MMHG

## 2024-05-08 DIAGNOSIS — R97.20 ELEVATED PSA, LESS THAN 10 NG/ML: ICD-10-CM

## 2024-05-08 DIAGNOSIS — I48.92 ATRIAL FLUTTER, UNSPECIFIED TYPE (HCC): ICD-10-CM

## 2024-05-08 DIAGNOSIS — I10 ESSENTIAL HYPERTENSION: ICD-10-CM

## 2024-05-08 DIAGNOSIS — Z00.00 ANNUAL PHYSICAL EXAM: Primary | ICD-10-CM

## 2024-05-08 DIAGNOSIS — R73.03 PREDIABETES: ICD-10-CM

## 2024-05-08 PROCEDURE — 99396 PREV VISIT EST AGE 40-64: CPT | Performed by: FAMILY MEDICINE

## 2024-05-08 NOTE — PROGRESS NOTES
Chief Complaint   Patient presents with    Physical     Annual       HPI:   Alex Biggs is a 59 year old male who presents for a complete physical exam. Several new medical issues in the last month.     Last colonoscopy:  6/29/2022.  Repeat 7 yrs.   Last PSA:  7.0.  further elevated.    Immunizations: reviewed    HTN - on amlodipine-benazepril.  BP controlled today.     Sugars - pre-DM with A1C 6.1.    Foods: breakfast - non frosted cereal like raisin bran.  With skim milk.    Cappuccino.   Lunch - salad daily with grilled chicken.   Dinner - varies.  At least 4 nights a week eating out.  No fast food.     Heart - a-flutter.  New diagnosis on Monday.  Heart racing after meal at night.  Heart was pounding when he was sitting on cough in the evening.  Oxygen 97%, .  Went to [facility].  After diagnosing and giving him some medicines, the HR was able to improve. Recommended he stop the inhaler.    Now wearing a monitor 7-10 days, and has appt with cardio team end of May.  Dr. Adelita Fields.    Incidentally, noted to have aortic ectasia, and splenic artery aneurysm.      Asthma - new diagnosis as well.  Advair and albuterol BID.  Asked by ER to stop the albuterol.      Wt Readings from Last 6 Encounters:   05/08/24 265 lb (120.2 kg)   04/04/24 261 lb 6 oz (118.6 kg)   04/18/23 262 lb (118.8 kg)   06/24/22 252 lb (114.3 kg)   02/25/22 252 lb 3.2 oz (114.4 kg)   10/28/21 244 lb (110.7 kg)     Body mass index is 39.71 kg/m².     Chemistry Labs:   Lab Results   Component Value Date/Time    GLU 99 05/04/2024 08:29 AM     05/04/2024 08:29 AM    K 4.5 05/04/2024 08:29 AM     05/04/2024 08:29 AM    CO2 26 05/04/2024 08:29 AM    CREATSERUM 0.98 05/04/2024 08:29 AM    CA 9.2 05/04/2024 08:29 AM    ALB 4.2 05/04/2024 08:29 AM    TP 6.8 05/04/2024 08:29 AM    ALKPHO 66 05/04/2024 08:29 AM    AST 17 05/04/2024 08:29 AM    ALT 25 05/04/2024 08:29 AM    BILT 1.1 05/04/2024 08:29 AM          Cholesterol  (most recent  labs)   Lab Results   Component Value Date/Time    CHOLEST 149 05/04/2024 08:29 AM    HDL 45 05/04/2024 08:29 AM    LDL 85 05/04/2024 08:29 AM    TRIG 94 05/04/2024 08:29 AM      No results found for: \"PSA\"      Current Outpatient Medications   Medication Sig Dispense Refill    fluticasone-salmeterol 100-50 MCG/ACT Inhalation Aerosol Powder, Breath Activated Inhale 1 puff into the lungs 2 (two) times daily. 120 each 2    albuterol (PROAIR HFA) 108 (90 Base) MCG/ACT Inhalation Aero Soln Inhale 2 puffs into the lungs every 4 (four) hours as needed for Wheezing. 18 g 1    amLODIPine Besy-Benazepril HCl 10-20 MG Oral Cap Take 1 capsule by mouth daily. 90 capsule 3      No past medical history on file.   Past Surgical History:   Procedure Laterality Date    Tonsillectomy  1/1/1975    with adenoidectomy      Family History   Problem Relation Age of Onset    Psychiatric Father         alzheimer's disease    Neurological Disorder Mother         parkinson's disease    Neurological Disorder Sister         MS      Social History:  Social History     Socioeconomic History    Marital status:    Occupational History    Occupation: Self-employed   Tobacco Use    Smoking status: Never    Smokeless tobacco: Never   Vaping Use    Vaping status: Never Used   Substance and Sexual Activity    Alcohol use: Yes     Comment: <1 drink a week    Drug use: No   Other Topics Concern     Service No    Caffeine Concern Yes     Comment: 1 coffee daily, soda occasionally    Exercise Yes     Comment: Walks daily    Seat Belt Yes     Social Determinants of Health      Received from [facility], [facility]    Social Connections    Received from [facility]    Housing Stability      Occ: [employer] . .   : yes. Children: grown children.   Exercise: walking, but limited.    Diet: as detailed above.      REVIEW OF SYSTEMS:     All systems reviewed, negative other  than noted above.    EXAM:   /66   Pulse 80   Temp 97 °F (36.1 °C)   Resp 16   Ht 5' 8.5\" (1.74 m)   Wt 265 lb (120.2 kg)   BMI 39.71 kg/m²   Body mass index is 39.71 kg/m².     General appearance: alert, appears stated age and cooperative.  Overweight.   Eyes: conjunctivae/corneas clear. PERRL, EOM's intact.   Ears: normal TM's and external ear canals both ears  Neck: no adenopathy, no JVD, supple, symmetrical, trachea midline and thyroid not enlarged, symmetric, no tenderness/mass/nodules  Lungs: clear to auscultation bilaterally  Heart: S1, S2 normal, no murmur, click, rub or gallop, regular rate and rhythm  Abdomen: soft, non-tender; bowel sounds normal; no masses,  no organomegaly  Extremities: extremities normal, atraumatic, no cyanosis or edema  Pulses: 2+ and symmetric  Neurologic: Alert and oriented X 3, normal strength and tone. Normal symmetric reflexes. Normal coordination and gait     ASSESSMENT AND PLAN:     Alex Biggs was seen in the office today:  had concerns including Physical (Annual ).    1. Annual physical exam  Overall not great  Several new issues in the last month  A-flutter.  New asthma  Pre-diabetes.   Working on lifestyle changes to help weight and several of these issues.      2. Essential hypertension  Blood pressure control today  Medicine stable.  Continue amlodipine benazepril combination    3. Elevated PSA, less than 10 ng/ml  PSA continues to elevate, with concerning percent free PSA  Will refer to urology team for further assessment  We did discuss that this is concerning for prostate cancer  - Urology Referral - In Network    4. Atrial flutter, unspecified type (HCC)  New diagnosis earlier this week  Has monitor on now, plan to see cardiology weeks    5. BMI 39.0-39.9,adult  Need to get the weight down.  Creeping up over time  Breakfast and lunch are reasonable.  Watch the portion size of both of these.  Dinner needs a total overhaul.  He is working on this.     6.  Prediabetes  A1c up in the prediabetic range  Working on being aware of carbs, getting weight down.  Being more mindful  Will recheck in 6 months        Cody Gonzalez M.D.   [facility]  05/08/24

## 2024-05-21 ENCOUNTER — PATIENT MESSAGE (OUTPATIENT)
Dept: FAMILY MEDICINE CLINIC | Facility: CLINIC | Age: 59
End: 2024-05-21

## 2024-05-21 DIAGNOSIS — I48.92 ATRIAL FLUTTER, UNSPECIFIED TYPE (HCC): Primary | ICD-10-CM

## 2024-05-21 NOTE — TELEPHONE ENCOUNTER
[facility], a Department of [facility] [place]  [address], [place], [address]  Phone  [phone]  Fax  [phone]

## 2024-05-21 NOTE — TELEPHONE ENCOUNTER
From: Alex Biggs  To: Cody Gonzalez  Sent: 5/21/2024 2:46 PM CDT  Subject: Referral required     Hi Doc,    The cardo clinic needs a referral from you for me to have the appointment. It's scheduled for next Friday on May 31st.     [facility]  With Alden Fields MD - [place] Office    Thanks,  Alex

## 2024-05-22 ENCOUNTER — TELEPHONE (OUTPATIENT)
Dept: FAMILY MEDICINE CLINIC | Facility: CLINIC | Age: 59
End: 2024-05-22

## 2024-06-28 ENCOUNTER — OFFICE VISIT (OUTPATIENT)
Dept: SURGERY | Facility: CLINIC | Age: 59
End: 2024-06-28

## 2024-06-28 DIAGNOSIS — R82.90 URINE FINDING: Primary | ICD-10-CM

## 2024-06-28 DIAGNOSIS — R97.20 ELEVATED PSA: ICD-10-CM

## 2024-06-28 LAB
APPEARANCE: CLEAR
BILIRUBIN: NEGATIVE
GLUCOSE (URINE DIPSTICK): NEGATIVE MG/DL
KETONES (URINE DIPSTICK): NEGATIVE MG/DL
LEUKOCYTES: NEGATIVE
MULTISTIX LOT#: NORMAL NUMERIC
NITRITE, URINE: NEGATIVE
OCCULT BLOOD: NEGATIVE
PH, URINE: 6 (ref 4.5–8)
PROTEIN (URINE DIPSTICK): NEGATIVE MG/DL
SPECIFIC GRAVITY: 1.02 (ref 1–1.03)
URINE-COLOR: YELLOW
UROBILINOGEN,SEMI-QN: 0.2 MG/DL (ref 0–1.9)

## 2024-06-28 PROCEDURE — 99204 OFFICE O/P NEW MOD 45 MIN: CPT | Performed by: SURGERY

## 2024-06-28 PROCEDURE — 81003 URINALYSIS AUTO W/O SCOPE: CPT | Performed by: SURGERY

## 2024-06-28 NOTE — PROGRESS NOTES
Urology Clinic Note - New Patient    Referring Provider:  Cody Gonzalez MD  [address]  [address]  [place] [address]     Primary Care Provider:  Cody Gonzalez MD     Chief Complaint:   Elevated PSA    HPI:   Alex Biggs is a 59 year old male with no significant PMH referred for elevated PSA.  PSA on 5//24 was 7.0 (7% free).  Prior to that on 4/13/2023 it was 5.3.  PSA trend as below.    Denies bothersome urinary symptoms.  Denies weak urinary stream.  Family history of brain cancer in his twin brother, otherwise no cancers in the family.    PSA:  5/6/2024: 7.0  4/13/2024: 5.3  4/5/2022: 4.7  2/8/2022: 5.2  12/3/2020: 4.2  5/16/2017: 4.4  No results found for: \"PSA\", \"PERCENTPSA\", \"PSAS\", \"PSAULTRA\", \"QPSA\", \"PSATOT\", \"TOTPSADX\", \"TOTPSASCREEN\"     Prior abdominal surgeries: Denies    AUA symptom score is 4/3 with LUTS.    Urinalysis (clinic dip): Negative    History:   No past medical history on file.    Past Surgical History:   Procedure Laterality Date    Tonsillectomy  1/1/1975    with adenoidectomy       Family History   Problem Relation Age of Onset    Psychiatric Father         alzheimer's disease    Neurological Disorder Mother         parkinson's disease    Neurological Disorder Sister         MS       Social History     Socioeconomic History    Marital status:    Occupational History    Occupation: Self-employed   Tobacco Use    Smoking status: Never    Smokeless tobacco: Never   Vaping Use    Vaping status: Never Used   Substance and Sexual Activity    Alcohol use: Yes     Comment: <1 drink a week    Drug use: No   Other Topics Concern     Service No    Caffeine Concern Yes     Comment: 1 coffee daily, soda occasionally    Exercise Yes     Comment: Walks daily    Seat Belt Yes     Social Determinants of Health      Received from [facility], [facility]    Social Connections    Received from [facility]  Stability       Medications (Active prior to today's visit):  Current Outpatient Medications   Medication Sig Dispense Refill    fluticasone-salmeterol 100-50 MCG/ACT Inhalation Aerosol Powder, Breath Activated Inhale 1 puff into the lungs 2 (two) times daily. 120 each 2    albuterol (PROAIR HFA) 108 (90 Base) MCG/ACT Inhalation Aero Soln Inhale 2 puffs into the lungs every 4 (four) hours as needed for Wheezing. 18 g 1    amLODIPine Besy-Benazepril HCl 10-20 MG Oral Cap Take 1 capsule by mouth daily. 90 capsule 3       Allergies:  Allergies   Allergen Reactions    Seasonal Runny nose     Pollen and Dust       Review of Systems:   A comprehensive 10-point review of systems was completed.  Pertinent positives and negatives are noted in the the HPI.    Physical Exam:   CONSTITUTIONAL: Well developed, well nourished, in no acute distress  NEUROLOGIC: Alert and oriented  HEAD: Normocephalic, atraumatic  EYES: Sclera non-icteric  ENT: Hearing intact, moist mucous membranes  NECK: No obvious goiter or masses  RESPIRATORY: Normal respiratory effort  SKIN: No evident rashes  ABDOMEN: Soft, non-tender, non-distended  GENITOURINARY: Normal phallus, orthotopic meatus, normal bilateral testicles  DIGITAL RECTAL EXAM: ~35 gram prostate, no nodules or tenderness, unable to palpate base    Assessment & Plan:   Alex Biggs is a 59 year old male with no significant PMH referred for elevated PSA.  PSA on 5//24 was 7.0 (7% free).  Prior to that on 4/13/2023 it was 5.3.  PSA trend as below.    Denies bothersome urinary symptoms.  Denies weak urinary stream.  Family history of brain cancer in his twin brother, otherwise no cancers in the family.    I discussed PSA screening in detail.  PSA screening is our best tool to detect prostate cancer, as prostate cancer typically has no other signs or symptoms.  I mentioned that prostate cells produce PSA that gets absorbed into the blood.  I discussed that multiple things besides prostate cancer  can elevate the PSA, however.  These include prostate enlargement, prostate infection or inflammation, recent ejaculation, recent cycling or prostate manipulation, recent urologic procedure or Parker catheter placement.  Because of this, we typically do not act on a single elevated PSA and look more at trends in the PSA.    -Prostate MRI  -Return to clinic a telephone visit in 4 weeks      Thank you for this consult.    I have personally reviewed all relevant medical records, labs, and imaging.    Medical Decision Making  Elevated PSA: Undiagnosed new problem    Amount and/or Complexity of Data Reviewed  External Data Reviewed: labs and notes.  Radiology: independent interpretation performed.        Josiah Cummins MD  Staff Urologist  [facility]  Office: [phone]

## 2024-07-09 ENCOUNTER — TELEPHONE (OUTPATIENT)
Dept: SURGERY | Facility: CLINIC | Age: 59
End: 2024-07-09

## 2024-07-12 ENCOUNTER — TELEPHONE (OUTPATIENT)
Dept: SURGERY | Facility: CLINIC | Age: 59
End: 2024-07-12

## 2024-07-12 NOTE — TELEPHONE ENCOUNTER
RN received an approval for CPT 52166 MRI   Service date 06/28/24 - 01/05/25  Reference #: [id number]

## 2024-08-05 NOTE — PROGRESS NOTES
Prostate MRI shows volume 50 cc, 0.9 cm PI-RADS 3 lesion in the right PZ apex.  There is also a slightly enlarged right external iliac lymph node measuring 1.0 cm.  Inflammation is seen throughout the prostate.    Future Appointments  8/6/2024   8:45 AM    Josiah Cummins MD           [id number]           [facility]  11/7/2024  8:00 AM    Cody Gonzalez MD       [facility] 3               [facility]

## 2024-08-06 ENCOUNTER — VIRTUAL PHONE E/M (OUTPATIENT)
Dept: SURGERY | Facility: CLINIC | Age: 59
End: 2024-08-06
Payer: COMMERCIAL

## 2024-08-06 DIAGNOSIS — R97.20 ELEVATED PSA: Primary | ICD-10-CM

## 2024-08-06 PROCEDURE — 99441 PHONE E/M BY PHYS 5-10 MIN: CPT | Performed by: SURGERY

## 2024-08-06 NOTE — PROGRESS NOTES
Urology Clinic Note  Telemedicine Visit  Audio Only  The patient consented to performing this visit virtually.     Primary Care Provider:  Cody Gonzalez MD     Chief Complaint:   Elevated PSA     HPI:   Alex Biggs is a 59 year old male with no significant PMH referred for elevated PSA.  PSA on 5/6/24 was 7.0 (7% free).  Prior to that on 4/13/2023 it was 5.3.  PSA trend as below.     Denies bothersome urinary symptoms.  Denies weak urinary stream.  Family history of brain cancer in his twin brother, otherwise no cancers in the family.     PSA:  5/6/2024: 7.0  4/13/2024: 5.3  4/5/2022: 4.7  2/8/2022: 5.2  12/3/2020: 4.2  5/16/2017: 4.4    Prostate MRI shows volume 50 cc, 0.9 cm PI-RADS 3 lesion in the right PZ apex. There is also a slightly enlarged right external iliac lymph node measuring 1.0 cm. Inflammation is seen throughout the prostate.     PSA:  No results found for: \"PSA\", \"PERCENTPSA\", \"PSAS\", \"PSAULTRA\"     History:   No past medical history on file.    Past Surgical History:   Procedure Laterality Date    Tonsillectomy  1/1/1975    with adenoidectomy       Family History   Problem Relation Age of Onset    Psychiatric Father         alzheimer's disease    Neurological Disorder Mother         parkinson's disease    Neurological Disorder Sister         MS       Social History     Socioeconomic History    Marital status:    Occupational History    Occupation: Self-employed   Tobacco Use    Smoking status: Never    Smokeless tobacco: Never   Vaping Use    Vaping status: Never Used   Substance and Sexual Activity    Alcohol use: Yes     Comment: <1 drink a week    Drug use: No   Other Topics Concern     Service No    Caffeine Concern Yes     Comment: 1 coffee daily, soda occasionally    Exercise Yes     Comment: Walks daily    Seat Belt Yes     Social Determinants of Health      Received from [facility], [facility]    Social Connections    Received from  [facility]    Housing Stability       Medications (Active prior to today's visit):  Current Outpatient Medications   Medication Sig Dispense Refill    albuterol (PROAIR HFA) 108 (90 Base) MCG/ACT Inhalation Aero Soln Inhale 2 puffs into the lungs every 4 (four) hours as needed for Wheezing. (Patient not taking: Reported on 6/28/2024) 18 g 1    amLODIPine Besy-Benazepril HCl 10-20 MG Oral Cap Take 1 capsule by mouth daily. 90 capsule 3       Allergies:  Allergies   Allergen Reactions    Seasonal Runny nose     Pollen and Dust       Review of Systems:   A comprehensive 10-point review of systems was completed.  Pertinent positives and negatives are noted in the the HPI.    Physical Exam:   No physical exam performed during this telephone encounter.    Assessment & Plan:   Alex Biggs is a 59 year old male with no significant PMH referred for elevated PSA.  PSA on 5/6/24 was 7.0 (7% free).  Prior to that on 4/13/2023 it was 5.3.  PSA trend as below.     Denies bothersome urinary symptoms.  Denies weak urinary stream.  Family history of brain cancer in his twin brother, otherwise no cancers in the family.     PSA:  5/6/2024: 7.0  4/13/2024: 5.3  4/5/2022: 4.7  2/8/2022: 5.2  12/3/2020: 4.2  5/16/2017: 4.4    Prostate MRI shows volume 50 cc, 0.9 cm PI-RADS 3 lesion in the right PZ apex. There is also a slightly enlarged right external iliac lymph node measuring 1.0 cm. Inflammation is seen throughout the prostate.     -Will think about prostate MRI versus repeat PSA in 6 months and let us know    In total, 5 minutes were spent on this patient encounter for medical discussion.    Josiah Cummins MD  Staff Urologist  [facility]  Office: [phone]

## 2024-08-23 DIAGNOSIS — I10 ESSENTIAL HYPERTENSION: ICD-10-CM

## 2024-08-26 ENCOUNTER — APPOINTMENT (OUTPATIENT)
Dept: ULTRASOUND IMAGING | Age: 59
End: 2024-08-26
Attending: EMERGENCY MEDICINE
Payer: COMMERCIAL

## 2024-08-26 ENCOUNTER — HOSPITAL ENCOUNTER (EMERGENCY)
Age: 59
Discharge: HOME | End: 2024-08-26
Attending: EMERGENCY MEDICINE
Payer: COMMERCIAL

## 2024-08-26 VITALS
WEIGHT: 264.56 LBS | BODY MASS INDEX: 40 KG/M2 | RESPIRATION RATE: 17 BRPM | SYSTOLIC BLOOD PRESSURE: 115 MMHG | DIASTOLIC BLOOD PRESSURE: 72 MMHG | TEMPERATURE: 98 F | HEART RATE: 61 BPM | OXYGEN SATURATION: 97 %

## 2024-08-26 DIAGNOSIS — L03.116 CELLULITIS OF LEFT LEG: Primary | ICD-10-CM

## 2024-08-26 DIAGNOSIS — R79.89 ELEVATED LFTS: ICD-10-CM

## 2024-08-26 LAB
ALBUMIN SERPL-MCNC: 3.9 G/DL (ref 3.4–5)
ALBUMIN/GLOB SERPL: 1.1 {RATIO} (ref 1–2)
ALP LIVER SERPL-CCNC: 113 U/L
ALT SERPL-CCNC: 245 U/L
ANION GAP SERPL CALC-SCNC: 6 MMOL/L (ref 0–18)
AST SERPL-CCNC: 147 U/L (ref 15–37)
BASOPHILS # BLD AUTO: 0.07 X10(3) UL (ref 0–0.2)
BASOPHILS NFR BLD AUTO: 1 %
BILIRUB SERPL-MCNC: 1.1 MG/DL (ref 0.1–2)
BUN BLD-MCNC: 20 MG/DL (ref 9–23)
CALCIUM BLD-MCNC: 9.5 MG/DL (ref 8.5–10.1)
CHLORIDE SERPL-SCNC: 107 MMOL/L (ref 98–112)
CO2 SERPL-SCNC: 25 MMOL/L (ref 21–32)
CREAT BLD-MCNC: 1.28 MG/DL
EGFRCR SERPLBLD CKD-EPI 2021: 64 ML/MIN/1.73M2 (ref 60–?)
EOSINOPHIL # BLD AUTO: 0.53 X10(3) UL (ref 0–0.7)
EOSINOPHIL NFR BLD AUTO: 7.4 %
ERYTHROCYTE [DISTWIDTH] IN BLOOD BY AUTOMATED COUNT: 14.4 %
GLOBULIN PLAS-MCNC: 3.4 G/DL (ref 2.8–4.4)
GLUCOSE BLD-MCNC: 125 MG/DL (ref 70–99)
HCT VFR BLD AUTO: 50.2 %
HGB BLD-MCNC: 16.9 G/DL
IMM GRANULOCYTES # BLD AUTO: 0.02 X10(3) UL (ref 0–1)
IMM GRANULOCYTES NFR BLD: 0.3 %
LACTATE SERPL-SCNC: 1.1 MMOL/L (ref 0.4–2)
LYMPHOCYTES # BLD AUTO: 1.09 X10(3) UL (ref 1–4)
LYMPHOCYTES NFR BLD AUTO: 15.3 %
MCH RBC QN AUTO: 28.3 PG (ref 26–34)
MCHC RBC AUTO-ENTMCNC: 33.7 G/DL (ref 31–37)
MCV RBC AUTO: 83.9 FL
MONOCYTES # BLD AUTO: 0.62 X10(3) UL (ref 0.1–1)
MONOCYTES NFR BLD AUTO: 8.7 %
NEUTROPHILS # BLD AUTO: 4.8 X10 (3) UL (ref 1.5–7.7)
NEUTROPHILS # BLD AUTO: 4.8 X10(3) UL (ref 1.5–7.7)
NEUTROPHILS NFR BLD AUTO: 67.3 %
OSMOLALITY SERPL CALC.SUM OF ELEC: 290 MOSM/KG (ref 275–295)
PLATELET # BLD AUTO: 237 10(3)UL (ref 150–450)
POTASSIUM SERPL-SCNC: 3.7 MMOL/L (ref 3.5–5.1)
PROT SERPL-MCNC: 7.3 G/DL (ref 6.4–8.2)
RBC # BLD AUTO: 5.98 X10(6)UL
SODIUM SERPL-SCNC: 138 MMOL/L (ref 136–145)
WBC # BLD AUTO: 7.1 X10(3) UL (ref 4–11)

## 2024-08-26 PROCEDURE — 83605 ASSAY OF LACTIC ACID: CPT | Performed by: EMERGENCY MEDICINE

## 2024-08-26 PROCEDURE — 96365 THER/PROPH/DIAG IV INF INIT: CPT

## 2024-08-26 PROCEDURE — 99285 EMERGENCY DEPT VISIT HI MDM: CPT

## 2024-08-26 PROCEDURE — 87040 BLOOD CULTURE FOR BACTERIA: CPT | Performed by: EMERGENCY MEDICINE

## 2024-08-26 PROCEDURE — 36415 COLL VENOUS BLD VENIPUNCTURE: CPT

## 2024-08-26 PROCEDURE — 93971 EXTREMITY STUDY: CPT | Performed by: EMERGENCY MEDICINE

## 2024-08-26 PROCEDURE — 96361 HYDRATE IV INFUSION ADD-ON: CPT

## 2024-08-26 PROCEDURE — 85025 COMPLETE CBC W/AUTO DIFF WBC: CPT | Performed by: EMERGENCY MEDICINE

## 2024-08-26 PROCEDURE — 80053 COMPREHEN METABOLIC PANEL: CPT | Performed by: EMERGENCY MEDICINE

## 2024-08-26 RX ORDER — CLINDAMYCIN HCL 300 MG
300 CAPSULE ORAL 3 TIMES DAILY
COMMUNITY

## 2024-08-26 RX ORDER — CEFADROXIL 500 MG/1
500 CAPSULE ORAL 2 TIMES DAILY
Qty: 14 CAPSULE | Refills: 0 | Status: SHIPPED | OUTPATIENT
Start: 2024-08-26 | End: 2024-09-02

## 2024-08-27 RX ORDER — AMLODIPINE AND BENAZEPRIL HYDROCHLORIDE 10; 20 MG/1; MG/1
1 CAPSULE ORAL DAILY
Qty: 90 CAPSULE | Refills: 3 | Status: SHIPPED | OUTPATIENT
Start: 2024-08-27

## 2024-08-27 NOTE — ED PROVIDER NOTES
Patient Seen in: [facility] [place]      History     Chief Complaint   Patient presents with    Cellulitis     Stated Complaint: cellulitis    Subjective:   59-year-old male, presents with cellulitis of the left leg.  States started several days ago.  Was put on clindamycin 3 days ago and has been compliant but it spreading.  Started in the posterior lateral left calf it is now spread to just below the knee just above the ankle.  Some tautness and tenderness noted in the calf itself.  His stenting happened years ago and is admitted for several days for IV antibiotics.  No fever.  He is not diabetic or immunocompromise.  No bites or stings that he is aware of.  Unsure of how this began.            Objective:   History reviewed. No pertinent past medical history.           Past Surgical History:   Procedure Laterality Date    Tonsillectomy  1/1/1975    with adenoidectomy                Social History     Socioeconomic History    Marital status:    Occupational History    Occupation: Self-employed   Tobacco Use    Smoking status: Never    Smokeless tobacco: Never   Vaping Use    Vaping status: Never Used   Substance and Sexual Activity    Alcohol use: Yes     Comment: <1 drink a week    Drug use: No   Other Topics Concern     Service No    Caffeine Concern Yes     Comment: 1 coffee daily, soda occasionally    Exercise Yes     Comment: Walks daily    Seat Belt Yes     Social Determinants of Health      Received from [facility], [facility]    Social Connections    Received from [facility]    Housing Stability              Review of Systems   Constitutional:  Negative for fever.   Respiratory:  Negative for shortness of breath.    Cardiovascular:  Negative for chest pain.   Gastrointestinal:  Negative for abdominal pain.   Musculoskeletal:  Negative for back pain.   Skin:  Positive for rash.   Neurological:  Negative for  numbness.       Positive for stated Chief Complaint: Cellulitis    Other systems are as noted in HPI.  Constitutional and vital signs reviewed.      All other systems reviewed and negative except as noted above.    Physical Exam     ED Triage Vitals [08/26/24 2000]   /82   Pulse 80   Resp 16   Temp 98.2 °F (36.8 °C)   Temp src Temporal   SpO2 97 %   O2 Device None (Room air)       Current Vitals:   Vital Signs  BP: 106/65  Pulse: 84  Resp: 18  Temp: 98.2 °F (36.8 °C)  Temp src: Temporal    Oxygen Therapy  SpO2: 93 %  O2 Device: None (Room air)            Physical Exam  Vitals and nursing note reviewed.   Constitutional:       Appearance: He is not toxic-appearing.   HENT:      Head: Normocephalic.   Cardiovascular:      Rate and Rhythm: Normal rate.      Pulses: Normal pulses.      Heart sounds: Normal heart sounds.   Pulmonary:      Effort: Pulmonary effort is normal. No respiratory distress.      Breath sounds: Normal breath sounds.   Musculoskeletal:         General: Swelling and tenderness present. Normal range of motion.      Cervical back: Neck supple.   Skin:     Findings: Erythema and rash present.   Neurological:      General: No focal deficit present.      Mental Status: He is alert.      Sensory: No sensory deficit.   Psychiatric:         Behavior: Behavior normal.         Some swelling of the left calf.  Some warmth noted.  Cellulitis noted to the left lower extremity.  2+ DP PT pulses.  No palpable cords.  No obvious wound.  No arthralgias.    ED Course     Labs Reviewed   COMP METABOLIC PANEL (14) - Abnormal; Notable for the following components:       Result Value    Glucose 125 (*)      (*)      (*)     All other components within normal limits   CBC WITH DIFFERENTIAL WITH PLATELET - Abnormal; Notable for the following components:    RBC 5.98 (*)     All other components within normal limits   LACTIC ACID, PLASMA - Normal   BLOOD CULTURE   BLOOD CULTURE                      MDM       US VENOUS DOPPLER LEG LEFT - DIAG IMG (CPT=93971)    Result Date: 8/26/2024  CONCLUSION:  No evidence of DVT in the left lower extremity   LOCATION:  [facility]    Dictated by (CST): Adrian Hanley MD on 8/26/2024 at 9:30 PM     Finalized by (CST): Adrian Hanley MD on 8/26/2024 at 9:31 PM        I independently interpreted the ultrasound left lower extremity without any obvious signs of occlusive DVT    Differential diagnosis includes, but is limited to, cellulitis, DVT, bacteremia, allergic reaction    Chart review demonstrates outpatient visit with urology and in August of this year    59-year-old male with left lower extremity cellulitis.  Mildly warm to touch.  No streaking.  No crepitus or gangrene.  Also negative for DVT.  No fevers.  No white count.  Has some slight transaminitis this could be antibiotic related or viral related and can follow-up to resolution as an outpatient.  Also believe probably has some component of sleep apnea when he falls asleep he desats to 88% but then bounces back immediately.  He is aware of this and will follow-up with his PCP for this.  Switched antibiotics to cefadroxil as an outpatient after giving Unasyn here.  Did discuss and consider and offer admission to the hospital.  Is very well-appearing, minimal warmth and pain.  No white count.  No fever.  No streaking.  Very strict return precautions.  Explained he could get worse, he is agreeable.  Culture sent.  Discharge home, shared decision made utilized, return precaution provided    Patient was screened and evaluated during this visit.  As the treating physician attending to the patient, I determined within reasonable clinical confidence and prior to discharge, that an emergency medical condition was not or was no longer present.  There was no indication for further evaluation, treatment, or admission on an emergency basis.  Comprehensive verbal and written discharge and follow-up instructions were provided to help  prevent relapse or worsening.  Patient was instructed to follow-up with their primary care provider for further evaluation and treatment, return immediately to ER for worsening, concerning, new, or changing/persisting symptoms. I discussed the case with the patient and they had no questions, complaints, or concerns.  Patient was comfortable going home.     Per the discharge paperwork, patients are encouraged to and given instructions on how to sign up for MyChart, where they have access to their records, including any/all incidental findings.     This note was prepared using Dragon Medical voice recognition dictation software. As a result errors may occur. When identified these errors have been corrected. While every attempt is made to correct errors during dictation discrepancies may still exist    Note to patient: The 21st Century Cures Act makes medical notes like these available to patients in the interest of transparency. However, this is a medical document intended as peer to peer communication. It is written in medical language and may contain abbreviations or verbiage that are unfamiliar. It may appear blunt or direct. Medical documents are intended to carry relevant information, facts as evident, and the clinical opinion of the practitioner.                                        Medical Decision Making      Disposition and Plan     Clinical Impression:  1. Cellulitis of left leg    2. Elevated LFTs         Disposition:  Discharge  8/26/2024 10:45 pm    Follow-up:  Cody Gonzalez MD  [address]  [address] [address] [phone]    Follow up            Medications Prescribed:  Current Discharge Medication List        START taking these medications    Details   cefadroxil 500 MG Oral Cap Take 1 capsule (500 mg total) by mouth 2 (two) times daily for 7 days.  Qty: 14 capsule, Refills: 0

## 2024-08-27 NOTE — TELEPHONE ENCOUNTER
Requested Prescriptions     Pending Prescriptions Disp Refills    AMLODIPINE BESY-BENAZEPRIL HCL 10-20 MG Oral Cap [Pharmacy Med Name: AMLODIPINE-BENAZEPRIL 10-20 MG] 90 capsule 3     Sig: TAKE 1 CAPSULE BY MOUTH EVERY DAY     LOV 5/8/2024     Patient was asked to follow-up in: 6 months    Appointment scheduled: 11/7/2024 Cody Gonzalez MD     Medication refilled per protocol.

## 2024-11-19 ENCOUNTER — TELEPHONE (OUTPATIENT)
Dept: FAMILY MEDICINE CLINIC | Facility: CLINIC | Age: 59
End: 2024-11-19

## 2024-11-19 DIAGNOSIS — J45.30 MILD PERSISTENT ASTHMA IN ADULT WITHOUT COMPLICATION (HCC): Primary | ICD-10-CM

## 2024-11-19 NOTE — TELEPHONE ENCOUNTER
Has had this for 2 weeks has been coughing currently they are in [facility] and are returning 11/21/24. No fever clear mucous no sinus, has wheezing with breathing asking to see pulmonologist

## 2024-11-19 NOTE — TELEPHONE ENCOUNTER
Yes, seeing pulm would be best option given the chronicity  Referral placed again    Dr rodriguez

## 2024-11-19 NOTE — TELEPHONE ENCOUNTER
Pt's wife is calling she said that he is struggling breathing again, this happened in March when [place] was [place]. He is not at the point of needing to go to the ER. They are asking for a pulmonologist referral I did schedule him with Dr Gonzalez on 11/25/24 at 4:30 pm. This has been addressed in earlier appts this year. Patient and wife were on the call. Please call after clearing with Dr. Gonzalez.  Appt made in case if not needed we can cancel it.

## 2024-11-19 NOTE — TELEPHONE ENCOUNTER
David Doan  [place] [address] [phone]   Called patient and gave him the contact information for the referral.

## 2024-11-25 ENCOUNTER — OFFICE VISIT (OUTPATIENT)
Dept: FAMILY MEDICINE CLINIC | Facility: CLINIC | Age: 59
End: 2024-11-25
Payer: COMMERCIAL

## 2024-11-25 VITALS
BODY MASS INDEX: 39.7 KG/M2 | SYSTOLIC BLOOD PRESSURE: 110 MMHG | HEIGHT: 68.5 IN | DIASTOLIC BLOOD PRESSURE: 68 MMHG | RESPIRATION RATE: 16 BRPM | WEIGHT: 265 LBS | HEART RATE: 86 BPM | OXYGEN SATURATION: 95 %

## 2024-11-25 DIAGNOSIS — R09.02 HYPOXIA: ICD-10-CM

## 2024-11-25 DIAGNOSIS — R05.3 CHRONIC COUGH: Primary | ICD-10-CM

## 2024-11-25 RX ORDER — AZITHROMYCIN 250 MG/1
TABLET, FILM COATED ORAL
Qty: 6 TABLET | Refills: 0 | Status: SHIPPED | OUTPATIENT
Start: 2024-11-25 | End: 2024-11-30

## 2024-11-25 RX ORDER — BENZONATATE 200 MG/1
CAPSULE ORAL
COMMUNITY
Start: 2024-11-04 | End: 2024-11-25

## 2024-11-25 NOTE — PROGRESS NOTES
Chief Complaint   Patient presents with    Referral     Patient here for pulmonologist referral    Cough     Patient here for cough started in March       HPI:   Alex Biggs is a 59 year old male who presents to the office with pulmonary concerns.    Long standing cough - nearly 9 months.  Seen by multiple parties.  There are times he has some trouble breathing.  Especially late at night.     Saw  11/4/24 - given steroids, albuterol nebs and inhaler, coricidin.  Did not seem to help.  The cough can get so bad he feels like throwing up and has dry heaving.  Can get lightheaded.    CXR at  negative.     Saw allergist - does not look like asthma.  Believes the only allergies he has are environmental.    Allergy med not helping (loratadine).      Sometimes productive - clear sputum.      Hypoxia - 93, up to 96% when resting.      REVIEW OF SYSTEMS:   Pertinent items are noted in HPI.  EXAM:   /68   Pulse 86   Resp 16   Ht 5' 8.5\" (1.74 m)   Wt 265 lb (120.2 kg)   SpO2 95%   BMI 39.71 kg/m²     General appearance - alert, well appearing, and in no distress  Chest - clear to auscultation, no wheezes, rales or rhonchi, symmetric air entry.  No adventitious sounds, but noted to be decreased air movement from what is expected.   ASSESSMENT AND PLAN:     Alex Biggs was seen in the office today:  had concerns including Referral (Patient here for pulmonologist referral) and Cough (Patient here for cough started in March ).    1. Chronic cough  2. Hypoxia  Concerning symptoms for 9 months now  Not improved with albuterol, cough meds.  Hypoxia noted as well - 95%  Trial azithromycin  Will send message to pulm team and see if someone can get him in sooner than 1/16/2024.    - azithromycin 250 MG Oral Tab; Take 2 tablets (500 mg total) by mouth daily for 1 day, THEN 1 tablet (250 mg total) daily for 4 days.  Dispense: 6 tablet; Refill: 0      Cody Gonzalez M.D.   [facility]  3  11/25/24            Note to patient: The 21 Century Cures Act makes medical notes like these available to patients in the interest of transparency. However, be advised this is a medical document. It is intended as peer to peer communication. It is written in medical language and may contain abbreviations or verbiage that are unfamiliar. It may appear blunt or direct. Medical documents are intended to carry relevant information, facts as evident, and the clinical opinion of the practitioner.

## 2024-11-27 ENCOUNTER — OFFICE VISIT (OUTPATIENT)
Facility: CLINIC | Age: 59
End: 2024-11-27
Payer: COMMERCIAL

## 2024-11-27 VITALS
TEMPERATURE: 98 F | HEART RATE: 83 BPM | HEIGHT: 69 IN | SYSTOLIC BLOOD PRESSURE: 124 MMHG | DIASTOLIC BLOOD PRESSURE: 82 MMHG | RESPIRATION RATE: 16 BRPM | BODY MASS INDEX: 38.51 KG/M2 | OXYGEN SATURATION: 96 % | WEIGHT: 260 LBS

## 2024-11-27 DIAGNOSIS — J45.909 UNCOMPLICATED ASTHMA, UNSPECIFIED ASTHMA SEVERITY, UNSPECIFIED WHETHER PERSISTENT (HCC): ICD-10-CM

## 2024-11-27 DIAGNOSIS — J30.9 ALLERGIC RHINITIS, UNSPECIFIED SEASONALITY, UNSPECIFIED TRIGGER: ICD-10-CM

## 2024-11-27 DIAGNOSIS — R05.9 COUGH IN ADULT: Primary | ICD-10-CM

## 2024-11-27 DIAGNOSIS — G47.30 SLEEP APNEA, UNSPECIFIED TYPE: ICD-10-CM

## 2024-11-27 DIAGNOSIS — I10 ESSENTIAL HYPERTENSION: ICD-10-CM

## 2024-11-27 PROCEDURE — 99204 OFFICE O/P NEW MOD 45 MIN: CPT | Performed by: INTERNAL MEDICINE

## 2024-11-27 RX ORDER — AZELASTINE HYDROCHLORIDE 137 UG/1
1-2 SPRAY, METERED NASAL 2 TIMES DAILY
Qty: 1 EACH | Refills: 3 | Status: SHIPPED | OUTPATIENT
Start: 2024-11-27 | End: 2024-12-27

## 2024-11-27 RX ORDER — MONTELUKAST SODIUM 10 MG/1
10 TABLET ORAL NIGHTLY
Qty: 30 TABLET | Refills: 3 | Status: SHIPPED | OUTPATIENT
Start: 2024-11-27

## 2024-11-27 RX ORDER — PREDNISONE 10 MG/1
TABLET ORAL
Qty: 40 TABLET | Refills: 0 | Status: SHIPPED | OUTPATIENT
Start: 2024-11-27

## 2024-11-27 RX ORDER — BUDESONIDE AND FORMOTEROL FUMARATE DIHYDRATE 160; 4.5 UG/1; UG/1
2 AEROSOL RESPIRATORY (INHALATION) 2 TIMES DAILY
Qty: 1 EACH | Refills: 2 | Status: SHIPPED | OUTPATIENT
Start: 2024-11-27 | End: 2024-11-29

## 2024-11-27 RX ORDER — TRIAMCINOLONE ACETONIDE 55 UG/1
2 SPRAY, METERED NASAL DAILY
Qty: 1 EACH | Refills: 2 | Status: SHIPPED | OUTPATIENT
Start: 2024-11-27 | End: 2024-12-27

## 2024-11-27 NOTE — PATIENT INSTRUCTIONS
Plan:        Prednisone 10 mg  tablets: Take  4 tablets daily for 4 days then 3 tablets daily for 4 days then 2 tablets daily for 4 days then 1 tablets daily for 4 days then stop.  Azelastine 137 mcg 1 puff twice daily as needed for congested and runny nose   Nasacort 2 puffs each nostril daily  Normal saline OTC nasal spray 2 puffs 4 times daily as needed to prevent nasal dryness   Montelukast 10 mg oral daily  Symbicort 160-4.5 mcg 2 puffs twice daily (patient states that he has problems with powdered inhaler like Advair)  Robitussin CF 2 teaspoon 4 times daily as needed for cough   If cough persists may need to discontinue Benazapril and use alternative medicine  Check CXR PA and LAT   Check Complete PFT with bronchodilators  Schedule Home sleep study to rule out sleep apnea      Follow up:  4 weeks        David Doan MD      Asthma  What is asthma?  Asthma is a long-term (chronic) ?lung disease. The airways react to triggers (allergens and irritants). This makes it hard to breathe. With exposure to triggers, changes can occur, such as:   The airways become swollen and inflamed.  The muscles around the airways tighten.  More mucus is made.  All of these changes make the airways narrow. This makes it hard for air to go out of the lungs. And fresh oxygen can't get into the body.   What causes asthma?  Experts don't know the exact cause of asthma. They believe it's partly inherited. The environment, infections, and chemicals released by the body also play a role.   Exercise causes symptoms in many people with asthma. Symptoms can occur during exercise. They can also occur right after exercise. In some people, stress or strong feelings can cause symptoms.   All of these may be asthma triggers:   Allergens Respiratory problem       Pollens (trees, grasses, and weeds)  Mold  Pets  Dust and dust mites  Cockroaches  Mice     Nasal allergies  Sinus infections  The flu  Viral infections, including the common cold    Irritants Medicines       Strong odors from perfumes, , cooking, paints, and varnishes  Chemicals (gases, fumes)  Air pollution  Changing weather (temperature, barometric pressure, humidity, and strong winds)  Smoke (tobacco-inhaled or secondhand)     Aspirin  NSAIDs (nonsteroidal anti-inflammatory drugs), such as ibuprofen  Beta blockers   Other conditions Other       GERD (gastroesophageal reflux)  Sleep apnea  Overweight  Depression     Exercise, especially in cold weather  Strong feelings that go along with laughing or crying       Who is at risk for asthma?  It's most common in:   Children and teens ages 5 to17  People living in cities  Other factors include:  Personal or family history of asthma or allergies  Exposure to secondhand tobacco smoke  Children with a family history of asthma  Children who have allergies or atopic dermatitis  Children exposed to secondhand and tobacco smoke  Living in areas with high levels of environmental air pollution  What are the symptoms of asthma?   Symptoms include:  Trouble breathing or shortness of breath  Chest tightness  Wheezing or a whistling sound when breathing  Coughing  Breathing becomes harder and may hurt  Talking and sleeping may be harder with severe symptoms  How is asthma diagnosed?  Your healthcare provider will ask about your health history. They will give you a physical exam. You will also have other tests. An important test is spirometry.   A spirometer is a device used to find out how well the lungs are working. It measures the amount and speed of air breathed out. Spirometry is typically done only in children over the age of 6 and adults.   You may have other tests. These are done to check for conditions, such as allergies.   How is asthma treated?  Treatment will depend on your symptoms, age, and general health. It will also depend on how severe the condition is.   There is no cure for asthma. It can often be controlled by staying away from  triggers. And by taking medicines as prescribed by your healthcare provider.   Watching for symptoms and taking early action is a key part of asthma care. So is knowing what to do if symptoms get worse. Experts advise making an Asthma Action Plan with your provider. Also educate your family and close friends the plan. This will help them provide correct asthma care if you are ill and can't care for yourself.   Medicines for asthma  The 2 types of asthma medicines are long-term control and short-term (quick-relief) medicines. Long-term control medicines are often taken every day. They help prevent symptoms. Quick-relief medicines calm asthma symptoms fast. But they only last for a short time. You may take either type of medicine alone. Some people take both.   Your healthcare provider should regularly check and adjust your medicines as needed.   Long-term control medicines   At first, it may take a few weeks for long-term control medicines to work. You must take these medicines every day. These medicines include:   Anti-inflammatory medicines. These reduce or prevent airway swelling.  Bronchodilators. These relax muscles around the airways.  Leukotriene modifiers. These block the action of chemicals called leukotrienes. These are chemicals that cause airways to be inflamed and narrowed.   Biologic therapy.  There are some newer medicines for people with asthma that isn't well-controlled despite inhaler therapy. These target the inflammatory cells in the body that start the asthma reaction. These medicines include anti-IL4, Anti-IL5, and anti-IgE medicines. They are often given by shot (injection) or infusion.  Quick-relief medicines  Quick-relief medicines work fast to relax the muscles around the airways. But the relief only lasts about 2 to 3 hours.   These medicines may include:  Inhaled short-acting beta2-agonists.  These help relax muscles around the airways.  Inhaled anticholinergics. These block a chemical in  the body called acetylcholine. This chemical contracts the muscles. It also causes more mucus in the airways.  Inhalation devices for asthma   Inhaled medicines go right to the lungs. They have fewer side effects than medicines taken by mouth. Inhaled medicines may be anti-inflammatory or bronchodilating. Or they may be both. The devices used are:   Metered-dose inhaler (MDI). This is the most common type of inhaler. It uses a chemical to push the medicine out of the inhaler. MDIs are held in front of or put into the mouth. Then the medicine is released in puffs. Or they may be used with a spacer device.  Nebulizer. This device sprays a fine mist of medicine. This is done through a mask using air under pressure, or an ultrasonic machine. A mouthpiece or mask is connected to a machine by plastic tubing to deliver medicine.  Dry powder or rotary inhaler. These inhalers deliver powered medicine as you breathe.  Living with asthma  Staying away from triggers is key in managing asthma. Triggers are specific to the individual and may include such things as allergens, irritants, other health problems, exercise, medicines, and strong emotions. The following can help you limit your exposure:   Allergies  Dust. Dust is the most common year-round allergen. The allergy is caused by tiny dust mites. Dust mites are found in mattresses, carpets, and fabric-covered (upholstered) furniture, such as sofas and chairs. They live best in warm, humid conditions. It's important to limit your exposure. Keep your living area as clean as possible by vacuuming and dusting on a weekly basis. Keep the use of carpets to a minimum and take extra care in the bedroom. Put dust mite covers on your mattress, box spring, and pillows.  Pollens. You may be allergic to pollen. If so, during pollen season keep all car and house windows closed. Use air conditioning if possible. If you have been outside, shower, wash your hair, and change clothes when you  go inside.   Pets. Pets that have fur or feathers often cause allergies. If you have pets, try not to touch them. If you do pet or handle them, wash your hands afterward. Keep pets off your furniture, bed, and out of your bedroom. Have someone brush and bathe your pet often.  Mold and mildew. These can trigger asthma. When outside, stay away from damp, shady areas. Use exhaust fans when cooking or bathing. Keep indoor humidity below 45%. And drain and clean your dehumidifier often.    Exercise  Exercise is a common asthma trigger. But don't limit sports or exercise unless a healthcare provider tells you to. Exercise is good for your health and lungs. Swimming, golf, and karate are good choices if you have asthma. Always warm up before exercise. And cool down after. Ask your provider about using your quick-relief medicine before starting exercise. Keep a log of what types of exercise trigger asthma problems and talk with your provider about possible ways to manage your symptoms.   Irritants  If you smoke, quit. This is hard to do, but your healthcare provider can help provide resources to aid your success.   Stay away from secondhand and thirdhand smoke. Don't let people smoke in your car or in your home.   Also, stay away from other types of smoke. Don’t use wood stoves or kerosene heaters. If you live in an area with air pollution, stay indoors during bad air days and wear a mask if you have to go outside. Also stay away from strong perfumes, cleaning products, fresh paint, and other things with strong odors.   Medicines  Some medicines can make asthma symptoms worse. These medicines include aspirin, NSAIDs (nonsteroidal anti-inflammatory drugs), and beta-blockers. Talk with your healthcare provider about your asthma history and medicine use.   Other health problems  Some health problems can make it harder to control asthma. These include:   Respiratory infections, such as colds and the flu  GERD (gastroesophageal  reflux) and heartburn  Being overweight  Sleep apnea  Depression    Work with your healthcare provider to treat any of these problems.   Strong emotions  The strong feelings that go with laughing and crying can trigger asthma symptoms. Stress and anxiety can also trigger asthma. If you are having trouble with stress and strong emotions, speak with your healthcare provider.   How daily issues affect your health  Many things in your daily life impact your health. This can include transportation, money problems, housing, access to food, and childcare. If you can’t get to medical appointments, you may not get the care you need. When money is tight, it may be hard to pay for medicines. And living far from a grocery store can make it hard to buy healthy food.   If you have concerns in any of these or other areas, talk with your healthcare team. They may know of local resources to help you. Or they may have a staff person who can help.   Key points about asthma  Asthma is a long-term (chronic) lung disease.  Triggers irritate sensitive airways. This makes it hard to breathe.  Staying away from triggers is an important part of treatment.  Long-term medicines control symptoms. They are taken every day, even when you feel well.  Rescue medicines provide quick symptom relief. But they are short-term.  An Asthma Action Plan can help patients and family members take appropriate, timely steps to control symptoms.    Next steps  Tips to help you get the most from a visit to your healthcare provider:   Know the reason for your visit and what you want to happen.  Before your visit, write down questions you want answered.  Bring someone with you to help you ask questions and remember what your provider tells you.  At the visit, write down the name of a new diagnosis, and any new medicines, treatments, or tests. Also write down any new instructions your provider gives you.  Know why a new medicine or treatment is prescribed, and how  it will help you. Also know what the side effects are.  Ask if your condition can be treated in other ways.  Know why a test or procedure is recommended and what the results could mean.  Know what to expect if you do not take the medicine or have the test or procedure.  If you have a follow-up appointment, write down the date, time, and purpose for that visit.  Know how you can contact your provider if you have questions.  [employer] last reviewed this educational content on 1/1/2023 © 2000-2023 [employer]. All rights reserved. This information is not intended as a substitute for professional medical care. Always follow your healthcare professional's instructions.      Getting the best readings -- Several steps are important to make sure the peak flow meter records an accurate value:  ?The peak flow meter should read zero or its lowest reading when not in use.  ?Use the peak flow meter while standing up straight.  ?Take in as deep a breath as possible.  ?Place the peak flow meter in the mouth, with the tongue under the mouthpiece.  ?Close the lips tightly around the mouthpiece.  ?Blow out as hard and fast as possible; do not throw the head forward while blowing out.  ?Breathe a few normal breaths and then repeat the process two more times. Write down the highest number obtained. Do not average the numbers.   Allergic Rhinitis  Allergic rhinitis is an allergic reaction that affects the nose, and often the eyes. It’s often known as nasal allergies. Nasal allergies are often due to things in the environment that are breathed in. Depending what you are sensitive to, nasal allergies may occur only during certain seasons, or they may occur year round. Common indoor allergens include house dust mites, mold, cockroaches, and pet dander. Outdoor allergens include pollen from trees, grasses, and weeds.   Symptoms include a drippy, stuffy, and itchy nose. They also include sneezing and red and itchy eyes. You may  feel tired more often. Severe allergies may also affect your breathing and trigger a condition called asthma.   Tests can be done to see what allergens are affecting you. You may be referred to an allergy specialist for testing and further evaluation.  Home care  Your healthcare provider may prescribe medicines to help relieve allergy symptoms. These may include oral medicines, nasal sprays, or eye drops.  Ask your provider for advice on how to stay away from substances that you are allergic to. Below are a few tips for each type of allergen.  Pet dander:  Do not have pets with fur and feathers.  If you have a pet, keep it out of your bedroom and off upholstered furniture.  Pollen:  When pollen counts are high, keep windows of your car and home closed. If possible, use an air conditioner instead.  Wear a filter mask when mowing or doing yard work.  House dust mites:  Wash bedding every week in warm water and detergent and dry on a hot setting.  Cover the mattress, box spring, and pillows with allergy covers.   If possible, sleep in a room with no carpet, curtains, or upholstered furniture.  Cockroaches:  Store food in sealed containers.  Remove garbage from the home promptly.  Fix water leaks.  Mold:  Keep humidity low by using a dehumidifier or air conditioner. Keep the dehumidifier and air conditioner clean and free of mold.  Clean moldy areas with bleach and water. Don't mix bleach with other .  In general:  Vacuum once or twice a week. If possible, use a vacuum with a high-efficiency particulate air (HEPA) filter.  Don't smoke. Stay away from cigarette smoke. Cigarette smoke is an irritant that can make symptoms worse.  Follow-up care  Follow up as advised by the healthcare provider or our staff. If you were referred to an allergy specialist, make this appointment promptly.  When to seek medical advice  Call your healthcare provider or get medical care right away if the following occur:  Coughing  Fever  of 100.4°F (38°C) or higher, or as directed by your healthcare provider  Raised red bumps (hives)  Continuing symptoms, new symptoms, or worsening symptoms  Call 911  Call 911 if you have:  Trouble breathing  Severe swelling of the face or severe itching of the eyes or mouth  Wheezing or shortness of breath  Chest tightness  Dizziness or lightheadedness  Feeling of doom  Stomach pain, bloating, vomiting, or diarrhea  [employer] last reviewed this educational content on 10/1/2019  © 8870-7209 [employer]. All rights reserved. This information is not intended as a substitute for professional medical care. Always follow your healthcare professional's instructions.

## 2024-11-27 NOTE — PROGRESS NOTES
Pulmonary/Critical Care/Sleep Medicine    Consult Note     PCP: Cody Gonzalez MD   Phone: [phone]   Fax: [phone]          Chief Complaint   Patient presents with    New Patient     Pt c/o dyspnea episodes after coughing , been going on 9 months, little clear mucus        HPI  I had the pleasure of seeing Alex Biggs who is a pleasant 59 year old male who presents for evaluation of KARISSA       The patient states around January 2024 he had a cold like symptoms. He saw a PA at urgent care and he developed cough who treated with 5 day course of prednisone. During  March 2024 he went to [place].and while In the [facility] he developed  shortness of breath requiring to call Paramedics oxygen sats for 83% and was placed on oxygen..He required 4 days of hospitalization. He was told to have an adult asthma attack. He was given Nebs and steroids that improved his condition. After return he saw Dr. Gonzalez, who tapered steroids. He saw an allergy specialist who said that he did not have asthma. During May 2024, he developed palpitations with heart rate of 188/min so saw a cardiologist who stopped his powdered inhaler. During early November 1, 2024 he again developed shortness of breath and cough and was  treated with prednisone, he still had cough and wheezing, breathlessness, his wife noted that he was short of breath. He saw Dr. Gonzalez who started on Antibiotic Zithromax last 2 days ago with no change in symptoms yet. So he presents for pulmonary evaluation.     The patient states that he has snored at home in past and wakes up with cough, he admits to some daytime fatigue . so he presents for sleep evaluation.  He states bed time around 8 PM . It takes few  min to fall asleep and leaves bed around 4  AM. He wakes up sometimes 1  times a night.  He is  fatigued during the daytime.  He denies nightmares, sleep talking or sleep walking.  He  denies AM headaches.  He denies symptoms sleep attacks         He drinks 1 cups of caffeine coffee daily, 1 caffeine glasses of tea and  caffeine cans of soda daily.       He has pets 1 dog  that does not sleep in bed.He is not allergic to dog        Hx of tobacco use: He  reports that he has never smoked. He has never used smokeless tobacco.    Past Medical History:    ALCOHOL USE    weekly glass of wine    Allergic rhinitis    Taking OTC medication    Essential hypertension    Working on it with medication    Obesity    Aware being overweight      Past Surgical History:   Procedure Laterality Date    Colonoscopy  2023    all clear    Tonsillectomy  01/01/1975    with adenoidectomy    Vasectomy  1996     Allergies[1]  Current Outpatient Medications   Medication Sig Dispense Refill    azithromycin 250 MG Oral Tab Take 2 tablets (500 mg total) by mouth daily for 1 day, THEN 1 tablet (250 mg total) daily for 4 days. 6 tablet 0    AMLODIPINE BESY-BENAZEPRIL HCL 10-20 MG Oral Cap TAKE 1 CAPSULE BY MOUTH EVERY DAY 90 capsule 3    albuterol (PROAIR HFA) 108 (90 Base) MCG/ACT Inhalation Aero Soln Inhale 2 puffs into the lungs every 4 (four) hours as needed for Wheezing. (Patient not taking: Reported on 11/27/2024) 18 g 1      Social History     Socioeconomic History    Marital status:    Occupational History    Occupation: Self-employed     Employer: [employer]     Comment: [employer]   Tobacco Use    Smoking status: Never    Smokeless tobacco: Never   Vaping Use    Vaping status: Never Used   Substance and Sexual Activity    Alcohol use: Yes     Alcohol/week: 2.0 standard drinks of alcohol     Types: 2 Glasses of wine per week     Comment: <1 drink a week    Drug use: Never   Other Topics Concern     Service No    Caffeine Concern Yes     Comment: 1 coffee daily, soda occasionally    Exercise Yes     Comment: Walks daily    Seat Belt Yes     Social Drivers of Health      Received from [facility], [facility]    Social Connections    Received from [facility]    Housing Stability      Immunization History   Administered Date(s) Administered    Covid-19 Vaccine [employer] (J&J) 0.5ml 03/09/2021    Covid-19 Vaccine Moderna 50 Mcg/0.25 Ml 01/13/2022    FLUZONE 6 months and older PFS 0.5 ml (47820) 10/19/2017, 10/03/2020    Flucelvax Influenza vaccine, trivalent (ccIIV3), 0.5mL IM 10/16/2022    HEP B 05/15/2017, 07/20/2017    Hep A, Adult 05/10/2017    Influenza 10/19/2017, 10/17/2022    Influenza(Afluria)0.5ml QIV PFS 10/03/2020    TDAP 02/27/2006, 05/10/2017, 10/03/2020      Family History   Problem Relation Age of Onset    Psychiatric Father         alzheimer's disease    Neurological Disorder Mother         parkinson's disease    Neurological Disorder Sister         MS        Review of Systems   Constitutional:  Positive for fatigue. Negative for fever and unexpected weight change.   HENT:  Positive for postnasal drip. Negative for congestion, mouth sores, nosebleeds, rhinorrhea, sore throat and trouble swallowing.    Eyes:  Negative for visual disturbance.   Respiratory:  Positive for cough and shortness of breath. Negative for apnea, choking, chest tightness and wheezing.    Cardiovascular:  Negative for chest pain, palpitations and leg swelling.   Gastrointestinal:  Negative for abdominal pain, constipation, diarrhea, nausea and vomiting.   Genitourinary:  Negative for difficulty urinating.   Musculoskeletal:  Negative for arthralgias, back pain, gait problem and myalgias.   Neurological:  Negative for dizziness, weakness and headaches.   Psychiatric/Behavioral:  Positive for sleep disturbance.         Vitals:    11/27/24 1021   BP: 124/82   Pulse: 83   Resp: 16   Temp: 97.6 °F (36.4 °C)      SpO2: 96 %  Ht Readings from Last 1 Encounters:   11/27/24 5' 9\" (1.753 m)     Wt Readings from Last 1 Encounters:   11/27/24 260 lb (117.9 kg)     Body mass index is 38.4 kg/m².     Physical  Exam  Constitutional:       General: He is not in acute distress.     Appearance: Normal appearance. He is obese. He is not ill-appearing or diaphoretic.   HENT:      Head: Normocephalic and atraumatic.      Nose: Nose normal. No congestion or rhinorrhea.      Comments: Narrow nares bilaterally      Mouth/Throat:      Mouth: Mucous membranes are moist.      Pharynx: Oropharynx is clear. No oropharyngeal exudate or posterior oropharyngeal erythema.      Comments: Mallampati class IV paltate  Eyes:      Extraocular Movements: Extraocular movements intact.      Pupils: Pupils are equal, round, and reactive to light.   Cardiovascular:      Rate and Rhythm: Normal rate.      Pulses: Normal pulses.      Heart sounds: Normal heart sounds. No murmur heard.  Pulmonary:      Effort: Pulmonary effort is normal. No respiratory distress.      Breath sounds: Wheezing (bilateral scattered) present. No rhonchi.   Chest:      Chest wall: No tenderness.   Abdominal:      General: Abdomen is flat. Bowel sounds are normal.      Palpations: Abdomen is soft.   Musculoskeletal:         General: Normal range of motion.      Comments: Trace bilateral LE edema    Skin:     General: Skin is warm.   Neurological:      General: No focal deficit present.      Mental Status: He is alert and oriented to person, place, and time.   Psychiatric:         Mood and Affect: Mood normal.         Behavior: Behavior normal.         Thought Content: Thought content normal.         Judgment: Judgment normal.             Labs:  Last BMP  Lab Results   Component Value Date     (H) 08/26/2024    BUN 20 08/26/2024    CREATSERUM 1.28 08/26/2024    BUNCREA SEE NOTE: 05/04/2024    ANIONGAP 6 08/26/2024    GFRAA 94 02/08/2022    GFRNAA 81 02/08/2022    CA 9.5 08/26/2024     08/26/2024    K 3.7 08/26/2024     08/26/2024    CO2 25.0 08/26/2024    OSMOCALC 290 08/26/2024      Last CBC  Lab Results   Component Value Date    WBC 7.1 08/26/2024    RBC  5.98 (H) 08/26/2024    HGB 16.9 08/26/2024    HCT 50.2 08/26/2024    MCV 83.9 08/26/2024    MCH 28.3 08/26/2024    MCHC 33.7 08/26/2024    RDW 14.4 08/26/2024    .0 08/26/2024      Last CMP  Lab Results   Component Value Date     (H) 08/26/2024    BUN 20 08/26/2024    BUNCREA SEE NOTE: 05/04/2024    CREATSERUM 1.28 08/26/2024    ANIONGAP 6 08/26/2024    GFRNAA 81 02/08/2022    GFRAA 94 02/08/2022    CA 9.5 08/26/2024    OSMOCALC 290 08/26/2024    ALKPHO 113 08/26/2024     (H) 08/26/2024     (H) 08/26/2024    BILT 1.1 08/26/2024    TP 7.3 08/26/2024    ALB 3.9 08/26/2024    GLOBULIN 3.4 08/26/2024    AGRATIO 1.6 05/04/2024     08/26/2024    K 3.7 08/26/2024     08/26/2024    CO2 25.0 08/26/2024      Last Thyroid Function  Lab Results   Component Value Date    TSHT4 2.61 05/04/2024        Imaging:  No results found.       Asthma Control Test:   (In The Last 4 Weeks)     Asthma Control Test Score: 11 points out of 25 points      14 or less  Asthma is very poorly controlled    15-19  Asthma is not as controlled as it could be   20-25  Asthma is under control    [place] Sleepiness Scale: (ESS) score on today's visit is 6  out of 24.     Score total of 1-6    Normal sleep   Score total of 7-8    Average sleepiness   Score total of 9-24    Abnormal (possibly pathologic) sleepiness       Impression:    Chronic cough, wheezing and shortness of breath , the patient takes Amlodipine and Benazepril for hypertension, the patient is reported to be allergic  to  pollen and dust mites.  The patient is noted to have narrow nares with edematous mucosa and appears to have symptoms of rhinitis with postnasal drip and upper airway cough syndrome.  Other differential diagnosis would include cough due to asthma versus ACE inhibitor (patient is on benazepril) etc.  Asthma, mild persistent clinically: With absolute eosinophil count of 0.53 micron/L:  Allergic Rhinitis   Snoring with hx hypertension  and c/o fatigue with Mallampati class IV palate suggesting high risk for KARISSA   Fatigue  Elevated LFT in 8/2024   Prediabetes Hgb A1C 6.1% in 5/2024   Mildly elevated PSA follows up with a Urologist   Hypertension                         Plan:      Prednisone 10 mg  tablets: Take  4 tablets daily for 4 days then 3 tablets daily for 4 days then 2 tablets daily for 4 days then 1 tablets daily for 4 days then stop.  Azelastine 137 mcg 1 puff twice daily as needed for congested and runny nose   Nasacort 2 puffs each nostril daily  Normal saline OTC nasal spray 2 puffs 4 times daily as needed to prevent nasal dryness   Montelukast 10 mg oral daily  Symbicort 160-4.5 mcg 2 puffs twice daily (patient states that he has problems with powdered inhaler like Advair)  Robitussin CF 2 teaspoon 4 times daily as needed for cough   If cough persists may need to discontinue Benazapril and use alternative medicine  Check CXR PA and LAT   Check Complete PFT with bronchodilators  Schedule Home sleep study to rule out sleep apnea     Follow up:  4 weeks     Thank you for allowing me to participate in your patient care.    JONO Doan MD, FACP, FCCP, FAASM - Pulmonary/Critical care/Sleep Medicine  Please contact our office if you have any questions or concerns at [phone]    Note to the patient: The 21st Century Cures Act makes medical notes like these available to patients in the interest of transparency. However, be advised that this is a medical document. It is intended as peer to peer communication. It is written in medical language and may contain abbreviations or verbiage that are unfamiliar. It may appear blunt or direct. Medical documents are intended to carry relevant information, facts as evident, and clinical opinion of the practitioner.      Disclaimer: Components of this note were documented using voice recognition system and are subject to errors not corrected at proofreading. Contact the author of this note for any  clarifications         [1]   Allergies  Allergen Reactions    Seasonal Runny nose     Pollen and Dust

## 2024-11-29 ENCOUNTER — HOSPITAL ENCOUNTER (OUTPATIENT)
Dept: GENERAL RADIOLOGY | Age: 59
Discharge: HOME | End: 2024-11-29
Attending: INTERNAL MEDICINE
Payer: COMMERCIAL

## 2024-11-29 ENCOUNTER — TELEPHONE (OUTPATIENT)
Facility: CLINIC | Age: 59
End: 2024-11-29

## 2024-11-29 DIAGNOSIS — J30.9 ALLERGIC RHINITIS, UNSPECIFIED SEASONALITY, UNSPECIFIED TRIGGER: ICD-10-CM

## 2024-11-29 DIAGNOSIS — R05.9 COUGH IN ADULT: ICD-10-CM

## 2024-11-29 DIAGNOSIS — J45.909 UNCOMPLICATED ASTHMA, UNSPECIFIED ASTHMA SEVERITY, UNSPECIFIED WHETHER PERSISTENT (HCC): ICD-10-CM

## 2024-11-29 PROCEDURE — 71046 X-RAY EXAM CHEST 2 VIEWS: CPT | Performed by: INTERNAL MEDICINE

## 2024-11-29 RX ORDER — MONTELUKAST SODIUM 10 MG/1
10 TABLET ORAL NIGHTLY
Qty: 90 TABLET | Refills: 1 | OUTPATIENT
Start: 2024-11-29

## 2024-11-29 RX ORDER — FLUTICASONE PROPIONATE AND SALMETEROL 250; 50 UG/1; UG/1
1 POWDER RESPIRATORY (INHALATION) 2 TIMES DAILY
Qty: 1 EACH | Refills: 5 | Status: SHIPPED | OUTPATIENT
Start: 2024-11-29

## 2024-11-29 NOTE — TELEPHONE ENCOUNTER
Symbicort not covered by insurance.  Per [facility], Wixela and Breo are covered.  Message forwarded to Dr. Doan and a secure message was also sent.

## 2024-12-31 ENCOUNTER — OFFICE VISIT (OUTPATIENT)
Facility: CLINIC | Age: 59
End: 2024-12-31
Payer: COMMERCIAL

## 2024-12-31 VITALS
SYSTOLIC BLOOD PRESSURE: 110 MMHG | TEMPERATURE: 98 F | HEART RATE: 91 BPM | HEIGHT: 69 IN | BODY MASS INDEX: 40.29 KG/M2 | RESPIRATION RATE: 18 BRPM | WEIGHT: 272 LBS | DIASTOLIC BLOOD PRESSURE: 70 MMHG | OXYGEN SATURATION: 98 %

## 2024-12-31 DIAGNOSIS — G47.30 SLEEP APNEA, UNSPECIFIED TYPE: ICD-10-CM

## 2024-12-31 DIAGNOSIS — R05.9 COUGH IN ADULT: Primary | ICD-10-CM

## 2024-12-31 DIAGNOSIS — G47.33 OBSTRUCTIVE SLEEP APNEA SYNDROME: ICD-10-CM

## 2024-12-31 DIAGNOSIS — J30.9 ALLERGIC RHINITIS, UNSPECIFIED SEASONALITY, UNSPECIFIED TRIGGER: ICD-10-CM

## 2024-12-31 DIAGNOSIS — J44.9 CHRONIC OBSTRUCTIVE PULMONARY DISEASE, UNSPECIFIED COPD TYPE (HCC): ICD-10-CM

## 2024-12-31 PROCEDURE — 99214 OFFICE O/P EST MOD 30 MIN: CPT | Performed by: INTERNAL MEDICINE

## 2024-12-31 RX ORDER — PREDNISONE 10 MG/1
TABLET ORAL
Qty: 20 TABLET | Refills: 0 | Status: SHIPPED | OUTPATIENT
Start: 2024-12-31

## 2024-12-31 NOTE — PROGRESS NOTES
Pulmonary/Critical Care/Sleep Medicine   Progress Note     PCP: Cody Gonzalez MD   Phone: [phone]   Fax: [phone]          Chief Complaint   Patient presents with    Follow - Up     Discuss CXR 11/2024       HPI  I had the pleasure of seeing Alex Biggs who is a pleasant 59 year old male who presents for follow up of KARISSA and cough       The patient states that his cough got better on the long prednisone taper.  But after several weeks off steroids he is noticing dry cough again.  He denies nasal congestion, heartburns or chest pain. He had no shortness of breath while he was steroids now again noticing some        The patient states that he has snored at home in past and wakes up with cough, he admits to some daytime fatigue . so he presents for sleep evaluation.  He states bed time around 8 PM . It takes few  min to fall asleep and leaves bed around 4  AM. He wakes up sometimes 1  times a night.  He is  fatigued during the daytime.  He denies nightmares, sleep talking or sleep walking.  He  denies AM headaches.  He denies symptoms sleep attacks        He drinks 1 cups of caffeine coffee daily, 1 caffeine glasses of tea and  caffeine cans of soda daily.       He has pets 1 dog  that does not sleep in bed.He is not allergic to dog        Hx of tobacco use: He  reports that he has never smoked. He has never used smokeless tobacco.    Past Medical History:    ALCOHOL USE    weekly glass of wine    Allergic rhinitis    Taking OTC medication    Essential hypertension    Working on it with medication    Obesity    Aware being overweight      Past Surgical History:   Procedure Laterality Date    Colonoscopy  2023    all clear    Tonsillectomy  01/01/1975    with adenoidectomy    Vasectomy  1996     Allergies[1]  Current Outpatient Medications   Medication Sig Dispense Refill    WIXELA INHUB 250-50 MCG/ACT Inhalation Aerosol Powder, Breath Activated Inhale 1 puff into the lungs 2 (two) times daily.  Rinse mouth after use. 1 each 5    montelukast 10 MG Oral Tab Take 1 tablet (10 mg total) by mouth nightly. 30 tablet 3    AMLODIPINE BESY-BENAZEPRIL HCL 10-20 MG Oral Cap TAKE 1 CAPSULE BY MOUTH EVERY DAY 90 capsule 3    albuterol (PROAIR HFA) 108 (90 Base) MCG/ACT Inhalation Aero Soln Inhale 2 puffs into the lungs every 4 (four) hours as needed for Wheezing. (Patient not taking: Reported on 12/31/2024) 18 g 1      Social History     Socioeconomic History    Marital status:    Occupational History    Occupation: Self-employed     Employer: [employer]     Comment: [employer]   Tobacco Use    Smoking status: Never    Smokeless tobacco: Never   Vaping Use    Vaping status: Never Used   Substance and Sexual Activity    Alcohol use: Yes     Alcohol/week: 2.0 standard drinks of alcohol     Types: 2 Glasses of wine per week     Comment: <1 drink a week    Drug use: Never   Other Topics Concern     Service No    Caffeine Concern Yes     Comment: 1 coffee daily, soda occasionally    Exercise Yes     Comment: Walks daily    Seat Belt Yes     Social Drivers of Health      Received [facility]    Social Connections    Received from [facility]    Housing Stability      Immunization History   Administered Date(s) Administered    Covid-19 Vaccine [employer] (J&J) 0.5ml 03/09/2021    Covid-19 Vaccine Moderna 50 Mcg/0.25 Ml 01/13/2022    FLUZONE 6 months and older PFS 0.5 ml (56612) 10/19/2017, 10/03/2020    Flucelvax Influenza vaccine, trivalent (ccIIV3), 0.5mL IM 10/16/2022    HEP B 05/15/2017, 07/20/2017    Hep A, Adult 05/10/2017    Influenza 10/19/2017, 10/17/2022    Influenza(Afluria)0.5ml QIV PFS 10/03/2020    TDAP 02/27/2006, 05/10/2017, 10/03/2020      Family History   Problem Relation Age of Onset    Neurological Disorder Mother         parkinson's disease    Psychiatric Father         alzheimer's disease    Other  (Alzheimers) Father     Neurological Disorder Sister         MS        Review of Systems   Constitutional:  Positive for fatigue. Negative for fever and unexpected weight change.   HENT:  Negative for congestion, mouth sores, nosebleeds, postnasal drip, rhinorrhea, sore throat and trouble swallowing.    Eyes:  Negative for visual disturbance.   Respiratory:  Positive for cough and shortness of breath. Negative for apnea, choking, chest tightness and wheezing.    Cardiovascular:  Negative for chest pain, palpitations and leg swelling.   Gastrointestinal:  Negative for abdominal pain, constipation, diarrhea, nausea and vomiting.   Genitourinary:  Negative for difficulty urinating.   Musculoskeletal:  Negative for arthralgias, back pain, gait problem and myalgias.   Neurological:  Negative for dizziness, weakness and headaches.   Psychiatric/Behavioral:  Positive for sleep disturbance.         Vitals:    12/31/24 1128   BP: 110/70   Pulse: 91   Resp: 18   Temp: 97.6 °F (36.4 °C)      SpO2: 98 %  Ht Readings from Last 1 Encounters:   12/31/24 5' 9\" (1.753 m)     Wt Readings from Last 1 Encounters:   12/31/24 272 lb (123.4 kg)     Body mass index is 40.17 kg/m².     Physical Exam  Constitutional:       General: He is not in acute distress.     Appearance: Normal appearance. He is obese. He is not ill-appearing or diaphoretic.   HENT:      Head: Normocephalic and atraumatic.      Nose: Nose normal. No congestion or rhinorrhea.      Comments: Narrow nares bilaterally      Mouth/Throat:      Mouth: Mucous membranes are moist.      Pharynx: Oropharynx is clear. No oropharyngeal exudate or posterior oropharyngeal erythema.      Comments: Mallampati class IV paltate  Eyes:      Extraocular Movements: Extraocular movements intact.      Pupils: Pupils are equal, round, and reactive to light.   Cardiovascular:      Rate and Rhythm: Normal rate.      Pulses: Normal pulses.      Heart sounds: Normal heart sounds. No murmur  heard.  Pulmonary:      Effort: Pulmonary effort is normal. No respiratory distress.      Breath sounds: No wheezing (bilateral scattered) or rhonchi.   Chest:      Chest wall: No tenderness.   Abdominal:      General: Abdomen is flat. Bowel sounds are normal.      Palpations: Abdomen is soft.   Musculoskeletal:         General: Normal range of motion.      Comments: Trace bilateral LE edema    Skin:     General: Skin is warm.   Neurological:      General: No focal deficit present.      Mental Status: He is alert and oriented to person, place, and time.   Psychiatric:         Mood and Affect: Mood normal.         Behavior: Behavior normal.         Thought Content: Thought content normal.         Judgment: Judgment normal.             Labs:  Last BMP  Lab Results   Component Value Date     (H) 08/26/2024    BUN 20 08/26/2024    CREATSERUM 1.28 08/26/2024    BUNCREA SEE NOTE: 05/04/2024    ANIONGAP 6 08/26/2024    GFRAA 94 02/08/2022    GFRNAA 81 02/08/2022    CA 9.5 08/26/2024     08/26/2024    K 3.7 08/26/2024     08/26/2024    CO2 25.0 08/26/2024    OSMOCALC 290 08/26/2024      Last CBC  Lab Results   Component Value Date    WBC 7.1 08/26/2024    RBC 5.98 (H) 08/26/2024    HGB 16.9 08/26/2024    HCT 50.2 08/26/2024    MCV 83.9 08/26/2024    MCH 28.3 08/26/2024    MCHC 33.7 08/26/2024    RDW 14.4 08/26/2024    .0 08/26/2024      Last CMP  Lab Results   Component Value Date     (H) 08/26/2024    BUN 20 08/26/2024    BUNCREA SEE NOTE: 05/04/2024    CREATSERUM 1.28 08/26/2024    ANIONGAP 6 08/26/2024    GFRNAA 81 02/08/2022    GFRAA 94 02/08/2022    CA 9.5 08/26/2024    OSMOCALC 290 08/26/2024    ALKPHO 113 08/26/2024     (H) 08/26/2024     (H) 08/26/2024    BILT 1.1 08/26/2024    TP 7.3 08/26/2024    ALB 3.9 08/26/2024    GLOBULIN 3.4 08/26/2024    AGRATIO 1.6 05/04/2024     08/26/2024    K 3.7 08/26/2024     08/26/2024    CO2 25.0 08/26/2024      Last Thyroid  Function  Lab Results   Component Value Date    TSHT4 2.61 05/04/2024        Imaging:     PROCEDURE:  XR CHEST PA + LAT CHEST (XZO=[id number]) personally reviewed      INDICATIONS:  J45.909 Uncomplicated asthma, unspecified asthma severity, unspecified whether persistent ([facility]) J30.9 Allergic rhinitis, unspecified seasonality, unspecified t*     COMPARISON:  None.     TECHNIQUE:  PA and lateral chest radiographs were obtained.     PATIENT STATED HISTORY: (As transcribed by Technologist)  The patient has a cough without congestion.         FINDINGS:    Normal heart size and pulmonary vascularity.  No focal consolidation.  Thin linear opacity at left lung base consistent with minimal atelectasis or scarring.  No pleural effusion.                   Impression   CONCLUSION:  Minimal left basilar atelectasis or scarring.        LOCATION:  [facility]        Dictated by (CST): Alex Morales MD on 11/29/2024 at 10:13 AM           Asthma Control Test:   (In The Last 4 Weeks)     Asthma Control Test Score: 20 points out of 25 points      14 or less  Asthma is very poorly controlled    15-19  Asthma is not as controlled as it could be   20-25  Asthma is under control    [place] Sleepiness Scale: (ESS) score on today's visit is 6  out of 24.     Score total of 1-6    Normal sleep   Score total of 7-8    Average sleepiness   Score total of 9-24    Abnormal (possibly pathologic) sleepiness       Impression:    Chronic cough, wheezing and shortness of breath , the patient takes Amlodipine and Benazepril for hypertension, the patient is reported to be allergic  to  pollen and dust mites.  The patient is noted to have narrow nares with edematous mucosa and appears to have symptoms of rhinitis with postnasal drip and upper airway cough syndrome.  Other differential diagnosis would include cough variant asthma versus ACE inhibitor (patient is on benazepril) etc.  Asthma, mild persistent clinically: With absolute eosinophil count of 0.53  micron/L:  Allergic Rhinitis   Snoring with hx hypertension and c/o fatigue with Mallampati class IV palate suggesting high risk for KARISSA   Fatigue  Elevated LFT in 8/2024   Prediabetes Hgb A1C 6.1% in 5/2024   Mildly elevated PSA follows up with a Urologist   Hypertension                         Plan:      Prednisone 10 mg  tablets: Take  4 tablets daily for 2 days then 3 tablets daily for 2 days then 2 tablets daily for 2 days then 1 tablets daily for 2 days then stop.  Azelastine 137 mcg 1 puff twice daily as needed for congested and runny nose   Nasacort 2 puffs each nostril daily  Normal saline OTC nasal spray 2 puffs 4 times daily as needed to prevent nasal dryness   Montelukast 10 mg oral daily  Symbicort 160-4.5 mcg 2 puffs twice daily  Robitussin CF 2 teaspoon 4 times daily as needed for cough   Will request Dr. Gonzalez to discontinue Benazapril and use alternative antihypertensive medicine  Follow up with Dr. Cody Gonzalez   Check Complete PFT with bronchodilators and mannitol challenge test    Schedule Home sleep study to rule out sleep apnea pending     Follow up:  2 months     Thank you for allowing me to participate in your patient care.    JONO Doan MD, FACP, FCCP, FAASM - Pulmonary/Critical care/Sleep Medicine  Please contact our office if you have any questions or concerns at [phone]    Note to the patient: The 21st Century Cures Act makes medical notes like these available to patients in the interest of transparency. However, be advised that this is a medical document. It is intended as peer to peer communication. It is written in medical language and may contain abbreviations or verbiage that are unfamiliar. It may appear blunt or direct. Medical documents are intended to carry relevant information, facts as evident, and clinical opinion of the practitioner.      Disclaimer: Components of this note were documented using voice recognition system and are subject to errors not corrected at  proofreading. Contact the author of this note for any clarifications         [1]   Allergies  Allergen Reactions    Seasonal Runny nose     Pollen and Dust

## 2024-12-31 NOTE — PATIENT INSTRUCTIONS
Plan:      Prednisone 10 mg  tablets: Take  4 tablets daily for 2 days then 3 tablets daily for 2 days then 2 tablets daily for 2 days then 1 tablets daily for 2 days then stop.  Azelastine 137 mcg 1 puff twice daily as needed for congested and runny nose   Nasacort 2 puffs each nostril daily  Normal saline OTC nasal spray 2 puffs 4 times daily as needed to prevent nasal dryness   Montelukast 10 mg oral daily  Symbicort 160-4.5 mcg 2 puffs twice daily  Robitussin CF 2 teaspoon 4 times daily as needed for cough   Will request discontinue Benazapril and use alternative medicine  Follow up with Dr. Cody Gonzalez   Check Complete PFT with bronchodilators pending   Schedule Home sleep study to rule out sleep apnea pending     Follow up:  2 months       David Doan MD

## 2025-02-15 ENCOUNTER — RT VISIT (OUTPATIENT)
Dept: RESPIRATORY THERAPY | Facility: HOSPITAL | Age: 60
End: 2025-02-15
Attending: INTERNAL MEDICINE
Payer: COMMERCIAL

## 2025-02-15 DIAGNOSIS — G47.33 OBSTRUCTIVE SLEEP APNEA SYNDROME: ICD-10-CM

## 2025-02-15 DIAGNOSIS — J44.9 CHRONIC OBSTRUCTIVE PULMONARY DISEASE, UNSPECIFIED COPD TYPE (HCC): ICD-10-CM

## 2025-02-21 PROCEDURE — 94010 BREATHING CAPACITY TEST: CPT | Performed by: INTERNAL MEDICINE

## 2025-02-21 PROCEDURE — 94729 DIFFUSING CAPACITY: CPT | Performed by: INTERNAL MEDICINE

## 2025-02-21 PROCEDURE — 94726 PLETHYSMOGRAPHY LUNG VOLUMES: CPT | Performed by: INTERNAL MEDICINE

## 2025-02-21 NOTE — PROCEDURES
Pulmonary Function Test Report  Findings:  Prebronchodilator FEV1 is 2.97L, z-score -0.55.  Prebronchodilator FVC is 3.68L, z-score -0.76.                           FEV1/ FVC ratio is 81 %, z-score 0.40.   The flow-volume loop demonstrates a normal pattern.  The TLC is 5.32L, z-score -1.97. The residual volume 1.64L, z-score -0.99.   The diffusion capacity is 24.58, z-score -0.48 and 0.77 when corrected for alveolar volume.     Impression:  Spirometry is normal.  There is mild (z-score -1.65 to -2.5) restriction.  This can be seen in heart failure, fluid overload states, interstitial lung disease, thoracic spine/chest disorders, obesity as well as other clinical scenarios.  Further clinical correlation required.      Diffusion capacity is normal.        Disclaimer: This PFT has been interpreted based on Z-score/LLN/ULN criteria as described in ATS guidelines 2022.   Mingo Marin MD  [facility]  Office: [phone]

## 2025-02-27 ENCOUNTER — TELEPHONE (OUTPATIENT)
Facility: CLINIC | Age: 60
End: 2025-02-27

## 2025-02-27 ENCOUNTER — OFFICE VISIT (OUTPATIENT)
Facility: CLINIC | Age: 60
End: 2025-02-27
Payer: COMMERCIAL

## 2025-02-27 VITALS
TEMPERATURE: 97 F | BODY MASS INDEX: 40.58 KG/M2 | HEART RATE: 67 BPM | DIASTOLIC BLOOD PRESSURE: 78 MMHG | RESPIRATION RATE: 14 BRPM | OXYGEN SATURATION: 94 % | WEIGHT: 274 LBS | HEIGHT: 69 IN | SYSTOLIC BLOOD PRESSURE: 110 MMHG

## 2025-02-27 DIAGNOSIS — R05.3 CHRONIC COUGH: ICD-10-CM

## 2025-02-27 DIAGNOSIS — G47.30 SLEEP APNEA, UNSPECIFIED TYPE: ICD-10-CM

## 2025-02-27 DIAGNOSIS — I49.9 IRREGULAR HEART BEAT: Primary | ICD-10-CM

## 2025-02-27 DIAGNOSIS — J30.9 ALLERGIC RHINITIS, UNSPECIFIED SEASONALITY, UNSPECIFIED TRIGGER: Primary | ICD-10-CM

## 2025-02-27 DIAGNOSIS — I49.9 IRREGULAR HEART BEAT: ICD-10-CM

## 2025-02-27 PROCEDURE — 99214 OFFICE O/P EST MOD 30 MIN: CPT | Performed by: INTERNAL MEDICINE

## 2025-02-27 RX ORDER — BENZONATATE 200 MG/1
200 CAPSULE ORAL 3 TIMES DAILY PRN
Qty: 30 CAPSULE | Refills: 2 | Status: SHIPPED | OUTPATIENT
Start: 2025-02-27

## 2025-02-27 NOTE — PROGRESS NOTES
Pulmonary/Critical Care/Sleep Medicine   Progress Note     PCP: Cody Gonzalez MD   Phone: [phone]   Fax: [phone]          Chief Complaint   Patient presents with    Follow - Up     LOV 12/31/24 for lingering cough - cough more prevalent since taking the tests.  PFT completed. Pt states he completed the Mannitol test as well    EPWorth sleep score - 2       HPI  I had the pleasure of seeing Alex Biggs who is a pleasant 59 year old male who presents for follow up of KARISSA and cough after visit on 12/31/2024       The patient states that his cough got better on the long prednisone taper. But is still present intermittently. He is still taking Amlodipine-Benazepril for Hypertension.       He denies nasal congestion, heartburns or chest pain. He has mild  shortness of breath that was better when he was on steroids     The patient states that he has snored at home in past and wakes up with cough, he admits to some daytime fatigue . so he presents for sleep evaluation.  He states bed time around 8 PM . It takes few  min to fall asleep and leaves bed around 4  AM. He wakes up sometimes 1  times a night.  He is  fatigued during the daytime.  He denies nightmares, sleep talking or sleep walking.  He  denies AM headaches.  He denies symptoms sleep attacks        He drinks 1 cups of caffeine coffee daily, 1 caffeine glasses of tea and occ caffeine cans of soda daily.       He has pets 1 dog  that does not sleep in bed.He is not allergic to dog        Hx of tobacco use: He  reports that he has never smoked. He has never used smokeless tobacco.    Past Medical History:    ALCOHOL USE    weekly glass of wine    Allergic rhinitis    Taking OTC medication    Essential hypertension    Working on it with medication    Obesity    Aware being overweight      Past Surgical History:   Procedure Laterality Date    Colonoscopy  2023    all clear    Tonsillectomy  01/01/1975    with adenoidectomy    Vasectomy  1996      Allergies[1]  Current Outpatient Medications   Medication Sig Dispense Refill    WIXELA INHUB 250-50 MCG/ACT Inhalation Aerosol Powder, Breath Activated Inhale 1 puff into the lungs 2 (two) times daily. Rinse mouth after use. 1 each 5    montelukast 10 MG Oral Tab Take 1 tablet (10 mg total) by mouth nightly. 30 tablet 3    AMLODIPINE BESY-BENAZEPRIL HCL 10-20 MG Oral Cap TAKE 1 CAPSULE BY MOUTH EVERY DAY 90 capsule 3    albuterol (PROAIR HFA) 108 (90 Base) MCG/ACT Inhalation Aero Soln Inhale 2 puffs into the lungs every 4 (four) hours as needed for Wheezing. (Patient not taking: Reported on 2/27/2025) 18 g 1      Social History     Socioeconomic History    Marital status:    Occupational History    Occupation: Self-employed     Employer: [employer]     Comment: [employer]   Tobacco Use    Smoking status: Never    Smokeless tobacco: Never   Vaping Use    Vaping status: Never Used   Substance and Sexual Activity    Alcohol use: Yes     Alcohol/week: 2.0 standard drinks of alcohol     Types: 2 Glasses of wine per week     Comment: <1 drink a week    Drug use: Never   Other Topics Concern     Service No    Caffeine Concern Yes     Comment: 1 coffee daily, soda occasionally    Exercise Yes     Comment: Walks daily    Seat Belt Yes     Social Drivers of Health      Received [facility]    Housing Stability      Immunization History   Administered Date(s) Administered    Covid-19 Vaccine Ailvxing net (J&J) 0.5ml 03/09/2021    Covid-19 Vaccine Moderna 50 Mcg/0.25 Ml 01/13/2022    FLUZONE 6 months and older PFS 0.5 ml (42505) 10/19/2017, 10/03/2020    Flucelvax Influenza vaccine, trivalent (ccIIV3), 0.5mL IM 10/16/2022    HEP B 05/15/2017, 07/20/2017    Hep A, Adult 05/10/2017    Influenza 10/19/2017, 10/17/2022    Influenza(Afluria)0.5ml QIV PFS 10/03/2020    TDAP 02/27/2006, 05/10/2017, 10/03/2020      Family History   Problem Relation Age of Onset    Neurological  Disorder Mother         parkinson's disease    Psychiatric Father         alzheimer's disease    Other (Alzheimers) Father     Neurological Disorder Sister         MS        Review of Systems   Constitutional:  Positive for fatigue. Negative for fever and unexpected weight change.   HENT:  Negative for congestion, mouth sores, nosebleeds, postnasal drip, rhinorrhea, sore throat and trouble swallowing.    Eyes:  Negative for visual disturbance.   Respiratory:  Positive for cough and shortness of breath. Negative for apnea, choking, chest tightness and wheezing.    Cardiovascular:  Negative for chest pain, palpitations and leg swelling.   Gastrointestinal:  Negative for abdominal pain, constipation, diarrhea, nausea and vomiting.   Genitourinary:  Negative for difficulty urinating.   Musculoskeletal:  Negative for arthralgias, back pain, gait problem and myalgias.   Neurological:  Negative for dizziness, weakness and headaches.   Psychiatric/Behavioral:  Positive for sleep disturbance.         Vitals:    02/27/25 0906   BP: 110/78   Pulse: 67   Resp: 14   Temp: 97.3 °F (36.3 °C)      SpO2: 94 %  Ht Readings from Last 1 Encounters:   02/27/25 5' 9\" (1.753 m)     Wt Readings from Last 1 Encounters:   02/27/25 274 lb (124.3 kg)     Body mass index is 40.46 kg/m².     Physical Exam  Constitutional:       General: He is not in acute distress.     Appearance: Normal appearance. He is obese. He is not ill-appearing or diaphoretic.   HENT:      Head: Normocephalic and atraumatic.      Nose: Nose normal. No congestion or rhinorrhea.      Comments: Narrow nares bilaterally R>L with edematous mucosa      Mouth/Throat:      Mouth: Mucous membranes are moist.      Pharynx: Oropharynx is clear. No oropharyngeal exudate or posterior oropharyngeal erythema.      Comments: Mallampati class IV paltate  Eyes:      Extraocular Movements: Extraocular movements intact.      Pupils: Pupils are equal, round, and reactive to light.    Cardiovascular:      Rate and Rhythm: Normal rate.      Pulses: Normal pulses.      Heart sounds: Normal heart sounds. No murmur heard.  Pulmonary:      Effort: Pulmonary effort is normal. No respiratory distress.      Breath sounds: No wheezing (bilateral scattered) or rhonchi.   Chest:      Chest wall: No tenderness.   Abdominal:      General: Abdomen is flat. Bowel sounds are normal.      Palpations: Abdomen is soft.   Musculoskeletal:         General: Normal range of motion.      Comments: Trace bilateral LE edema    Skin:     General: Skin is warm.   Neurological:      General: No focal deficit present.      Mental Status: He is alert and oriented to person, place, and time.   Psychiatric:         Mood and Affect: Mood normal.         Behavior: Behavior normal.         Thought Content: Thought content normal.         Judgment: Judgment normal.             Labs:  Last BMP  Lab Results   Component Value Date     (H) 08/26/2024    BUN 20 08/26/2024    CREATSERUM 1.28 08/26/2024    BUNCREA SEE NOTE: 05/04/2024    ANIONGAP 6 08/26/2024    GFRAA 94 02/08/2022    GFRNAA 81 02/08/2022    CA 9.5 08/26/2024     08/26/2024    K 3.7 08/26/2024     08/26/2024    CO2 25.0 08/26/2024    OSMOCALC 290 08/26/2024      Last CBC  Lab Results   Component Value Date    WBC 7.1 08/26/2024    RBC 5.98 (H) 08/26/2024    HGB 16.9 08/26/2024    HCT 50.2 08/26/2024    MCV 83.9 08/26/2024    MCH 28.3 08/26/2024    MCHC 33.7 08/26/2024    RDW 14.4 08/26/2024    .0 08/26/2024      Last CMP  Lab Results   Component Value Date     (H) 08/26/2024    BUN 20 08/26/2024    BUNCREA SEE NOTE: 05/04/2024    CREATSERUM 1.28 08/26/2024    ANIONGAP 6 08/26/2024    GFRNAA 81 02/08/2022    GFRAA 94 02/08/2022    CA 9.5 08/26/2024    OSMOCALC 290 08/26/2024    ALKPHO 113 08/26/2024     (H) 08/26/2024     (H) 08/26/2024    BILT 1.1 08/26/2024    TP 7.3 08/26/2024    ALB 3.9 08/26/2024    GLOBULIN 3.4  08/26/2024    AGRATIO 1.6 05/04/2024     08/26/2024    K 3.7 08/26/2024     08/26/2024    CO2 25.0 08/26/2024      Last Thyroid Function  Lab Results   Component Value Date    TSHT4 2.61 05/04/2024        Imaging:     PROCEDURE:  XR CHEST PA + LAT CHEST (YEC=[id number]) personally reviewed      INDICATIONS:  J45.909 Uncomplicated asthma, unspecified asthma severity, unspecified whether persistent ([facility]) J30.9 Allergic rhinitis, unspecified seasonality, unspecified t*     COMPARISON:  None.     TECHNIQUE:  PA and lateral chest radiographs were obtained.     PATIENT STATED HISTORY: (As transcribed by Technologist)  The patient has a cough without congestion.         FINDINGS:    Normal heart size and pulmonary vascularity.  No focal consolidation.  Thin linear opacity at left lung base consistent with minimal atelectasis or scarring.  No pleural effusion.                   Impression   CONCLUSION:  Minimal left basilar atelectasis or scarring.        LOCATION:  [facility]        Dictated by (CST): Alex Morales MD on 11/29/2024 at 10:13 AM           Asthma Control Test:   (In The Last 4 Weeks)     Asthma Control Test Score: 25 points out of 25 points      14 or less  Asthma is very poorly controlled    15-19  Asthma is not as controlled as it could be   20-25  Asthma is under control    Pulmonary Function Test Report  Findings:  Prebronchodilator FEV1 is 2.97L, z-score -0.55.  Prebronchodilator FVC is 3.68L, z-score -0.76.                           FEV1/ FVC ratio is 81 %, z-score 0.40.   The flow-volume loop demonstrates a normal pattern.  The TLC is 5.32L, z-score -1.97. The residual volume 1.64L, z-score -0.99.   The diffusion capacity is 24.58, z-score -0.48 and 0.77 when corrected for alveolar volume.     Impression:  Spirometry is normal.  There is mild (z-score -1.65 to -2.5) restriction.  This can be seen in heart failure, fluid overload states, interstitial lung disease, thoracic spine/chest disorders,  obesity as well as other clinical scenarios.  Further clinical correlation required.       Diffusion capacity is normal.     Disclaimer: This PFT has been interpreted based on Z-score/LLN/ULN criteria as described in ATS guidelines 2022.   Mingo Marin MD  [facility]monary Medicine  Office: [phone]       Procedure Note    Mannitol Challenge Test 2/15/2025:      Interpretation:  Upon challenge with Mannitol Inhalation Cumulative dose of 635.000) at [address] 9, FEV1 declined 2.97L to 2.59L or 12.6% from baseline. ( PD (-15) , FEV1 % decline greater than 15% is considered clinically significant) Classification of Airway Hyper-responsiveness (AHR) Mannitol (Cumulated Dose): Mild AHR : >155mg; Moderate AHR :> 35 mg and <= 155 mg; Marked AHR : <=  35 mg (Ref: from Eur Repir J 2018: 52: [id number].      Impression     This test is negative for hyper-responsiveness after challenge with mannitol inhalation. Further Clinical evaluation is recommended         David Doan MD     [place] Sleepiness Scale: (ESS) score on today's visit is 6  out of 24.     Score total of 1-6    Normal sleep   Score total of 7-8    Average sleepiness   Score total of 9-24    Abnormal (possibly pathologic) sleepiness       Impression:    Chronic cough, wheezing and shortness of breath , the patient takes Amlodipine and Benazepril for hypertension, the patient is reported to be allergic  to  pollen and dust mites.  The patient is noted to have narrow nares with edematous mucosa and appears to have symptoms of rhinitis with postnasal drip and upper airway cough syndrome.  Other differential diagnosis would include cough variant asthma versus ACE inhibitor (patient is on benazepril) etc.  Asthma, mild persistent clinically: With absolute eosinophil count of 0.53 micron/L. Mannitol challenge test is borderline for bronchospasm   Allergic Rhinitis   Snoring with hx hypertension and c/o fatigue with Mallampati class  IV palate suggesting high risk for KARISSA   Occ skip beat ? PVC's vs Atrial fibrillation   Fatigue  Elevated LFT in 8/2024   Prediabetes Hgb A1C 6.1% in 5/2024   Mildly elevated PSA follows up with a Urologist   Hypertension                         Plan:      Obtain environmental allergy test from [facility] Dr. Phong Davidson at [place], [place]   Check EKG   .Azelastine 137 mcg 1 puff twice daily as needed for congested and runny nose   Nasacort 2 puffs each nostril daily  Tessalon pearles 200 mg tid as needed for cough   Normal saline OTC nasal spray 2 puffs 4 times daily as needed to prevent nasal dryness   Montelukast 10 mg oral daily  Symbicort 160-4.5 mcg 2 puffs twice daily  Robitussin CF 2 teaspoon 4 times daily as needed for cough   Will request Dr. Gonzalez to discontinue Benazapril and use alternative antihypertensive medicine  Follow up with Dr. Cody Gonzalez   Schedule Home sleep study to rule out sleep apnea pending on 3/14/2025     Follow up:  2 months     Thank you for allowing me to participate in your patient care.    JONO Doan MD, FACP, FCCP, FAASM - Pulmonary/Critical care/Sleep Medicine  Please contact our office if you have any questions or concerns at [phone]    Note to the patient: The 21st Century Cures Act makes medical notes like these available to patients in the interest of transparency. However, be advised that this is a medical document. It is intended as peer to peer communication. It is written in medical language and may contain abbreviations or verbiage that are unfamiliar. It may appear blunt or direct. Medical documents are intended to carry relevant information, facts as evident, and clinical opinion of the practitioner.      Disclaimer: Components of this note were documented using voice recognition system and are subject to errors not corrected at proofreading. Contact the author of this note for any clarifications         [1]   Allergies  Allergen  Reactions    Seasonal Runny nose     Pollen and Dust

## 2025-02-27 NOTE — PATIENT INSTRUCTIONS
Plan:      Obtain environmental allergy test from [facility] Dr. Phong Davidson at [place], [place]   Check EKG   .Azelastine 137 mcg 1 puff twice daily as needed for congested and runny nose   Nasacort 2 puffs each nostril daily  Tessalon pearles 200 mg tid as needed for cough   Normal saline OTC nasal spray 2 puffs 4 times daily as needed to prevent nasal dryness   Montelukast 10 mg oral daily  Symbicort 160-4.5 mcg 2 puffs twice daily  Robitussin CF 2 teaspoon 4 times daily as needed for cough   Will request Dr. Gonzalez to discontinue Benazapril and use alternative antihypertensive medicine  Follow up with Dr. Cody Gonzalez   Schedule Home sleep study to rule out sleep apnea pending on 3/14/2025     Follow up:  2 months     David Doan MD      Allergic Rhinitis  Allergic rhinitis is an allergic reaction that affects the nose, and often the eyes. It’s often known as nasal allergies. Nasal allergies are often due to things in the environment that are breathed in. Depending what you are sensitive to, nasal allergies may occur only during certain seasons, or they may occur year round. Common indoor allergens include house dust mites, mold, cockroaches, and pet dander. Outdoor allergens include pollen from trees, grasses, and weeds.   Symptoms include a drippy, stuffy, and itchy nose. They also include sneezing and red and itchy eyes. You may feel tired more often. Severe allergies may also affect your breathing and trigger a condition called asthma.   Tests can be done to see what allergens are affecting you. You may be referred to an allergy specialist for testing and further evaluation.  Home care  Your healthcare provider may prescribe medicines to help relieve allergy symptoms. These may include oral medicines, nasal sprays, or eye drops.  Ask your provider for advice on how to stay away from substances that you are allergic to. Below are a few tips for each type of allergen.  Pet  dander:  Do not have pets with fur and feathers.  If you have a pet, keep it out of your bedroom and off upholstered furniture.  Pollen:  When pollen counts are high, keep windows of your car and home closed. If possible, use an air conditioner instead.  Wear a filter mask when mowing or doing yard work.  House dust mites:  Wash bedding every week in warm water and detergent and dry on a hot setting.  Cover the mattress, box spring, and pillows with allergy covers.   If possible, sleep in a room with no carpet, curtains, or upholstered furniture.  Cockroaches:  Store food in sealed containers.  Remove garbage from the home promptly.  Fix water leaks.  Mold:  Keep humidity low by using a dehumidifier or air conditioner. Keep the dehumidifier and air conditioner clean and free of mold.  Clean moldy areas with bleach and water. Don't mix bleach with other .  In general:  Vacuum once or twice a week. If possible, use a vacuum with a high-efficiency particulate air (HEPA) filter.  Don't smoke. Stay away from cigarette smoke. Cigarette smoke is an irritant that can make symptoms worse.  Follow-up care  Follow up as advised by the healthcare provider or our staff. If you were referred to an allergy specialist, make this appointment promptly.  When to seek medical advice  Call your healthcare provider or get medical care right away if the following occur:  Coughing  Fever of 100.4°F (38°C) or higher, or as directed by your healthcare provider  Raised red bumps (hives)  Continuing symptoms, new symptoms, or worsening symptoms  Call 911  Call 911 if you have:  Trouble breathing  Severe swelling of the face or severe itching of the eyes or mouth  Wheezing or shortness of breath  Chest tightness  Dizziness or lightheadedness  Feeling of doom  Stomach pain, bloating, vomiting, or diarrhea  [employer] last reviewed this educational content on 10/1/2019  © 7332-7335 [employer]. All rights reserved. This  information is not intended as a substitute for professional medical care. Always follow your healthcare professional's instructions.

## 2025-02-27 NOTE — TELEPHONE ENCOUNTER
David Doan MD Gibson, Jonathan, MD; P [facility] Pulmonary Clinical Staff    · Obtain environmental allergy test from [facility] Dr. Phong Davidson at [place], [place]  · Check EKG    Request faxed to Dr. Phong Davidson office,    EKG ordered.

## 2025-03-01 ENCOUNTER — PATIENT MESSAGE (OUTPATIENT)
Dept: FAMILY MEDICINE CLINIC | Facility: CLINIC | Age: 60
End: 2025-03-01

## 2025-03-06 NOTE — TELEPHONE ENCOUNTER
Environmental allergy test from [facility] performed on 4/29/2024 shows allergy to pollens and cat    David Doan MD

## 2025-03-14 ENCOUNTER — OFFICE VISIT (OUTPATIENT)
Dept: SLEEP CENTER | Age: 60
End: 2025-03-14
Attending: INTERNAL MEDICINE
Payer: COMMERCIAL

## 2025-03-14 DIAGNOSIS — G47.30 SLEEP APNEA, UNSPECIFIED TYPE: ICD-10-CM

## 2025-03-14 PROCEDURE — 95806 SLEEP STUDY UNATT&RESP EFFT: CPT

## 2025-03-19 ENCOUNTER — SLEEP STUDY (OUTPATIENT)
Facility: CLINIC | Age: 60
End: 2025-03-19
Payer: COMMERCIAL

## 2025-03-19 DIAGNOSIS — G47.30 SLEEP APNEA, UNSPECIFIED TYPE: Primary | ICD-10-CM

## 2025-03-19 PROCEDURE — 95806 SLEEP STUDY UNATT&RESP EFFT: CPT | Performed by: INTERNAL MEDICINE

## 2025-03-20 ENCOUNTER — TELEPHONE (OUTPATIENT)
Facility: CLINIC | Age: 60
End: 2025-03-20

## 2025-03-20 DIAGNOSIS — I10 ESSENTIAL HYPERTENSION: Primary | ICD-10-CM

## 2025-03-20 DIAGNOSIS — G47.30 SLEEP APNEA, UNSPECIFIED TYPE: ICD-10-CM

## 2025-03-20 NOTE — TELEPHONE ENCOUNTER
Detailed Trustrib[employer]art message sent, the study showed obstructive sleep apnea at AHI 32.2, O2sat kaykay 79%%.  Explained the mechanism of KARISSA.   Advised treatment with continuous positive airway pressure is recommended.  Sazzehart message instructed patient to schedule his cpap titration at [facility]. Related recommendation to avoid alcohol, sedating medication before sleep and not to drive while drowsy. Provided call back # should patient have questions.   [phone] (home) [phone] (work)    Sleep study recommendations:  PLAN:    1.       At the request of referring physician, we will confer with the patient regarding the results of the sleep study and make treatment recommendations.  2.       The patient is a candidate for treatment with positive airway pressure (PAP) advised as first-line therapy.  Alternatives include oral appliance therapy and evaluation of upper airway by ear, nose, and throat specialist.  3.       Continuous positive airway pressure (CPAP) titration sleep study should be completed considering nocturnal hypoxemia.  4.       Advise the patient to avoid alcoholic beverages and medications that are respiratory depressants and, if drowsy, use caution when driving or operating machinery.

## 2025-04-05 ENCOUNTER — EKG ENCOUNTER (OUTPATIENT)
Dept: LAB | Age: 60
End: 2025-04-05
Attending: INTERNAL MEDICINE
Payer: COMMERCIAL

## 2025-04-05 ENCOUNTER — APPOINTMENT (OUTPATIENT)
Dept: ULTRASOUND IMAGING | Age: 60
End: 2025-04-05
Attending: EMERGENCY MEDICINE
Payer: COMMERCIAL

## 2025-04-05 ENCOUNTER — HOSPITAL ENCOUNTER (EMERGENCY)
Age: 60
Discharge: HOME | End: 2025-04-05
Attending: EMERGENCY MEDICINE
Payer: COMMERCIAL

## 2025-04-05 VITALS
BODY MASS INDEX: 39.25 KG/M2 | HEART RATE: 63 BPM | RESPIRATION RATE: 16 BRPM | DIASTOLIC BLOOD PRESSURE: 82 MMHG | OXYGEN SATURATION: 95 % | WEIGHT: 265 LBS | TEMPERATURE: 98 F | SYSTOLIC BLOOD PRESSURE: 122 MMHG | HEIGHT: 69 IN

## 2025-04-05 DIAGNOSIS — I49.9 IRREGULAR HEART BEAT: ICD-10-CM

## 2025-04-05 DIAGNOSIS — L03.116 CELLULITIS OF LEFT LOWER EXTREMITY: Primary | ICD-10-CM

## 2025-04-05 LAB
ALBUMIN SERPL-MCNC: 4.4 G/DL (ref 3.2–4.8)
ALBUMIN/GLOB SERPL: 1.8 {RATIO} (ref 1–2)
ALP LIVER SERPL-CCNC: 68 U/L
ALT SERPL-CCNC: 30 U/L
ANION GAP SERPL CALC-SCNC: 8 MMOL/L (ref 0–18)
AST SERPL-CCNC: 19 U/L (ref ?–34)
ATRIAL RATE: 67 BPM
BASOPHILS # BLD AUTO: 0.12 X10(3) UL (ref 0–0.2)
BASOPHILS NFR BLD AUTO: 1.6 %
BILIRUB SERPL-MCNC: 0.9 MG/DL (ref 0.2–1.1)
BUN BLD-MCNC: 16 MG/DL (ref 9–23)
CALCIUM BLD-MCNC: 9.5 MG/DL (ref 8.7–10.6)
CHLORIDE SERPL-SCNC: 107 MMOL/L (ref 98–112)
CO2 SERPL-SCNC: 26 MMOL/L (ref 21–32)
CREAT BLD-MCNC: 1.04 MG/DL
EGFRCR SERPLBLD CKD-EPI 2021: 82 ML/MIN/1.73M2 (ref 60–?)
EOSINOPHIL # BLD AUTO: 0.55 X10(3) UL (ref 0–0.7)
EOSINOPHIL NFR BLD AUTO: 7.6 %
ERYTHROCYTE [DISTWIDTH] IN BLOOD BY AUTOMATED COUNT: 13.5 %
GLOBULIN PLAS-MCNC: 2.4 G/DL (ref 2–3.5)
GLUCOSE BLD-MCNC: 97 MG/DL (ref 70–99)
HCT VFR BLD AUTO: 51.9 %
HGB BLD-MCNC: 17.3 G/DL
IMM GRANULOCYTES # BLD AUTO: 0.01 X10(3) UL (ref 0–1)
IMM GRANULOCYTES NFR BLD: 0.1 %
LACTATE SERPL-SCNC: 0.9 MMOL/L (ref 0.5–2)
LYMPHOCYTES # BLD AUTO: 1.43 X10(3) UL (ref 1–4)
LYMPHOCYTES NFR BLD AUTO: 19.6 %
MCH RBC QN AUTO: 28.6 PG (ref 26–34)
MCHC RBC AUTO-ENTMCNC: 33.3 G/DL (ref 31–37)
MCV RBC AUTO: 85.9 FL
MONOCYTES # BLD AUTO: 0.68 X10(3) UL (ref 0.1–1)
MONOCYTES NFR BLD AUTO: 9.3 %
NEUTROPHILS # BLD AUTO: 4.49 X10 (3) UL (ref 1.5–7.7)
NEUTROPHILS # BLD AUTO: 4.49 X10(3) UL (ref 1.5–7.7)
NEUTROPHILS NFR BLD AUTO: 61.8 %
OSMOLALITY SERPL CALC.SUM OF ELEC: 293 MOSM/KG (ref 275–295)
P AXIS: 32 DEGREES
P-R INTERVAL: 180 MS
PLATELET # BLD AUTO: 235 10(3)UL (ref 150–450)
POTASSIUM SERPL-SCNC: 4.1 MMOL/L (ref 3.5–5.1)
PROT SERPL-MCNC: 6.8 G/DL (ref 5.7–8.2)
Q-T INTERVAL: 394 MS
QRS DURATION: 80 MS
QTC CALCULATION (BEZET): 416 MS
R AXIS: -4 DEGREES
RBC # BLD AUTO: 6.04 X10(6)UL
SODIUM SERPL-SCNC: 141 MMOL/L (ref 136–145)
T AXIS: 23 DEGREES
VENTRICULAR RATE: 67 BPM
WBC # BLD AUTO: 7.3 X10(3) UL (ref 4–11)

## 2025-04-05 PROCEDURE — 93005 ELECTROCARDIOGRAM TRACING: CPT

## 2025-04-05 PROCEDURE — 99284 EMERGENCY DEPT VISIT MOD MDM: CPT

## 2025-04-05 PROCEDURE — 83605 ASSAY OF LACTIC ACID: CPT | Performed by: EMERGENCY MEDICINE

## 2025-04-05 PROCEDURE — 85025 COMPLETE CBC W/AUTO DIFF WBC: CPT | Performed by: EMERGENCY MEDICINE

## 2025-04-05 PROCEDURE — 93971 EXTREMITY STUDY: CPT | Performed by: EMERGENCY MEDICINE

## 2025-04-05 PROCEDURE — 93010 ELECTROCARDIOGRAM REPORT: CPT | Performed by: INTERNAL MEDICINE

## 2025-04-05 PROCEDURE — 80053 COMPREHEN METABOLIC PANEL: CPT | Performed by: EMERGENCY MEDICINE

## 2025-04-05 PROCEDURE — 96365 THER/PROPH/DIAG IV INF INIT: CPT

## 2025-04-05 PROCEDURE — 96361 HYDRATE IV INFUSION ADD-ON: CPT

## 2025-04-05 PROCEDURE — 99285 EMERGENCY DEPT VISIT HI MDM: CPT

## 2025-04-05 RX ORDER — SODIUM CHLORIDE 9 MG/ML
125 INJECTION, SOLUTION INTRAVENOUS CONTINUOUS
Status: DISCONTINUED | OUTPATIENT
Start: 2025-04-05 | End: 2025-04-05

## 2025-04-05 NOTE — ED INITIAL ASSESSMENT (HPI)
Pt being treated for cellulitis of left leg.  Pt finished 10 course of abx 3 days ago.  Pt states there is no improvement and clinic sent Pt over for eval.

## 2025-04-05 NOTE — DISCHARGE INSTRUCTIONS
Tylenol for pain  Augmentin as prescribed  Return for increased redness, pain or fever  Recheck with primary care physician on Monday or return here if not improving

## 2025-04-05 NOTE — ED PROVIDER NOTES
Patient Seen in: [facility] In [place]      History     Chief Complaint   Patient presents with    Cellulitis     Stated Complaint: cellulitis to left leg    Subjective:   HPI      This is a 59-year-old male past medical history of obesity, hypertension, recurrent cellulitis of left lower extremity.  Patient was admitted in 8/2019 to [facility].  Was given IV Rocephin/Vanco developed a rash.  It was discontinued.  Patient was placed on clindamycin and discharged home.  Patient recurrent bout in 8/26/2024 and was discharged home on Keflex and did fine.  Most recently he developed left lower extremity cellulitis for about 2 weeks.  He went to walk-in clinic they placed him on cephalexin again for 10 days and he states it is not going away at this time.  Denies any fevers.  No falls.  No trauma.  No history of DVT or PE.  He presents here for further evaluation.    Objective:     Past Medical History:    ALCOHOL USE    weekly glass of wine    Allergic rhinitis    Taking OTC medication    Essential hypertension    Working on it with medication    Obesity    Aware being overweight              Past Surgical History:   Procedure Laterality Date    Colonoscopy  2023    all clear    Tonsillectomy  01/01/1975    with adenoidectomy    Vasectomy  1996                Social History     Socioeconomic History    Marital status:    Occupational History    Occupation: Self-employed     Employer: [employer]     Comment: [employer]   Tobacco Use    Smoking status: Never    Smokeless tobacco: Never   Vaping Use    Vaping status: Never Used   Substance and Sexual Activity    Alcohol use: Yes     Alcohol/week: 2.0 standard drinks of alcohol     Types: 2 Glasses of wine per week     Comment: <1 drink a week    Drug use: Never   Other Topics Concern     Service No    Caffeine Concern Yes     Comment: 1 coffee daily, soda occasionally    Exercise Yes     Comment: Walks daily    Seat  Belt Yes     Social Drivers of Health      Received from [facility]    Housing Stability                  Physical Exam     ED Triage Vitals [04/05/25 1334]   /87   Pulse 67   Resp 18   Temp 97.8 °F (36.6 °C)   Temp src Temporal   SpO2 94 %   O2 Device None (Room air)       Current Vitals:   Vital Signs  BP: 122/82  Pulse: 63  Resp: 16  Temp: 97.8 °F (36.6 °C)  Temp src: Temporal    Oxygen Therapy  SpO2: 95 %  O2 Device: None (Room air)        Physical Exam  GENERAL: Awake, alert oriented x3, nontoxic appearing.   SKIN: Normal, warm, and dry.  HEENT:  Pupils equally round and reactive to light. Conjuctiva clear.  Oropharynx is clear and moist.   Lungs: Clear to auscultation bilaterally with no rales, no retractions, and no wheezing.  HEART:  Regular rate and rhythm. S1 and S2. No murmurs, no rubs or gallops.   ABDOMEN: Soft, nontender and nondistended. Normoactive bowel sounds. No rebound. No guarding.   EXTREMITIES: Left lower extremity erythema, edema.  No calf pain or tenderness.    ED Course     Labs Reviewed   CBC WITH DIFFERENTIAL WITH PLATELET - Abnormal; Notable for the following components:       Result Value    RBC 6.04 (*)     All other components within normal limits   COMP METABOLIC PANEL (14) - Normal   LACTIC ACID, PLASMA - Normal        US VENOUS DOPPLER LEG LEFT - DIAG IMG (CPT=93971)    Result Date: 4/5/2025  PROCEDURE:  US VENOUS DOPPLER LEG LEFT - DIAG IMG (CPT=93971)  COMPARISON:  [facility] , US VENOUS DOPPLER LEG LEFT - DIAG IMG (CPT=93971), 8/26/2024, 8:44 PM.  INDICATIONS:  Left lower extremity swelling  TECHNIQUE:  Real time, grey scale, and duplex ultrasound was used to evaluate the lower extremity venous system. B-mode two-dimensional images of the vascular structures, Doppler spectral analysis, and color flow.  Doppler imaging were performed.  The following veins were imaged:  Common, deep, and superficial femoral, popliteal, sapheno-femoral junction,  posterior tibial veins, and the contralateral common femoral vein.  PATIENT STATED HISTORY: (As transcribed by Technologist)  Patient stated left calf swelling and redness.    FINDINGS:  EXTREMITY EXAMINED:  Left lower extremity SAPHENOFEMORAL JUNCTION:  No reflux. THROMBI:  None visible. COMPRESSION:  Normal compressibility, phasicity, and augmentation. OTHER:  Negative.            CONCLUSION:  No evidence of deep vein thrombus in the left lower extremity.   LOCATION:  [facility]    Dictated by (CST): Kurt Hernandez MD on 4/05/2025 at 5:25 PM     Finalized by (CST): Kurt Hernandez MD on 4/05/2025 at 5:25 PM                MDM        This is a 59-year-old male past medical history of obesity, hypertension, recurrent cellulitis of left lower extremity.  Patient was admitted in 8/2019 to [facility].  Was given IV Rocephin/Vanco developed a rash.  It was discontinued.  Patient was placed on clindamycin and discharged home.  Patient recurrent bout in 8/26/2024 and was discharged home on Keflex and did fine.  Most recently he developed left lower extremity cellulitis for about 2 weeks.  He went to walk-in clinic they placed him on cephalexin again for 10 days and he states it is not going away at this time.  Denies any fevers.  No falls.  No trauma.  No history of DVT or PE.  Differential includes recurrent cellulitis, DVT.      IV line was established.  He was given 3 g of Unasyn.  Basic labs were obtained.  CBC: White blood cell count 7.3.  Hemoglobin 17.3.  Platelet 235.  CMP: BUN 16.  Creatinine 0.0.  Glucose 97.  Bicarb 26.  Lactic acid 0.9.    Ultrasound left lower extremity ultrasound was obtained and demonstrated no evidence of DVT.      Discussed plan of care with patient.  We discussed admission for IV antibiotics or transition to Augmentin and see if this does not improve.  He can recheck with his PCP on Monday or return here for recheck.  He would prefer to go home.  If the erythema gets worse or he develops a  fever he will go to the main [facility] emergency room for recheck over the weekend.  He is comfortable with this plan.  He can take Tylenol for pain.  He was discharged home in good condition.                    Disposition and Plan     Clinical Impression:  1. Cellulitis of left lower extremity         Disposition:  Discharge  4/5/2025  6:01 pm    Follow-up:  Cody Gonzalez MD  [address]  [facility] [address]  [phone]    Follow up on 4/7/2025            Medications Prescribed:  Current Discharge Medication List        START taking these medications    Details   amoxicillin clavulanate 875-125 MG Oral Tab Take 1 tablet by mouth 2 (two) times daily for 10 days.  Qty: 20 tablet, Refills: 0                 Supplementary Documentation:

## 2025-04-08 ENCOUNTER — TELEPHONE (OUTPATIENT)
Facility: CLINIC | Age: 60
End: 2025-04-08

## 2025-04-25 ENCOUNTER — HOSPITAL ENCOUNTER (EMERGENCY)
Age: 60
Discharge: HOME | End: 2025-04-25
Attending: EMERGENCY MEDICINE
Payer: COMMERCIAL

## 2025-04-25 ENCOUNTER — APPOINTMENT (OUTPATIENT)
Dept: ULTRASOUND IMAGING | Age: 60
End: 2025-04-25
Attending: EMERGENCY MEDICINE
Payer: COMMERCIAL

## 2025-04-25 VITALS
WEIGHT: 264.56 LBS | TEMPERATURE: 99 F | HEART RATE: 88 BPM | RESPIRATION RATE: 16 BRPM | DIASTOLIC BLOOD PRESSURE: 77 MMHG | OXYGEN SATURATION: 98 % | BODY MASS INDEX: 39 KG/M2 | SYSTOLIC BLOOD PRESSURE: 112 MMHG

## 2025-04-25 DIAGNOSIS — L03.116 LEFT LEG CELLULITIS: Primary | ICD-10-CM

## 2025-04-25 PROCEDURE — 99284 EMERGENCY DEPT VISIT MOD MDM: CPT

## 2025-04-25 PROCEDURE — 93971 EXTREMITY STUDY: CPT | Performed by: EMERGENCY MEDICINE

## 2025-04-25 RX ORDER — SULFAMETHOXAZOLE AND TRIMETHOPRIM 800; 160 MG/1; MG/1
1 TABLET ORAL ONCE
Status: DISCONTINUED | OUTPATIENT
Start: 2025-04-25 | End: 2025-04-25

## 2025-04-25 RX ORDER — SULFAMETHOXAZOLE AND TRIMETHOPRIM 800; 160 MG/1; MG/1
1 TABLET ORAL 2 TIMES DAILY
Qty: 20 TABLET | Refills: 0 | Status: SHIPPED | OUTPATIENT
Start: 2025-04-25 | End: 2025-05-05

## 2025-04-25 NOTE — ED PROVIDER NOTES
Patient Seen in: [facility] In [place]      History     Chief Complaint   Patient presents with    Cellulitis     Stated Complaint: left leg cellulitis    Subjective:     HPI    60-year-old male with hypertension who was in the emergency department nearly 3 weeks ago with left leg swelling.  He was put on Augmentin for 10-day course and it went down.  He had an ultrasound at that time that showed no DVT.  He said it largely resolved and then now recurred.  No fever. The patient denies having a fever and reports no chest pain or shortness of breath.     Objective:   Past Medical History:    ALCOHOL USE    weekly glass of wine    Allergic rhinitis    Taking OTC medication    Essential hypertension    Working on it with medication    Obesity    Aware being overweight              Past Surgical History:   Procedure Laterality Date    Colonoscopy  2023    all clear    Tonsillectomy  01/01/1975    with adenoidectomy    Vasectomy  1996                Social History     Socioeconomic History    Marital status:    Occupational History    Occupation: Self-employed     Employer: [employer]     Comment: [employer]   Tobacco Use    Smoking status: Never    Smokeless tobacco: Never   Vaping Use    Vaping status: Never Used   Substance and Sexual Activity    Alcohol use: Yes     Alcohol/week: 2.0 standard drinks of alcohol     Types: 2 Glasses of wine per week     Comment: <1 drink a week    Drug use: Never   Other Topics Concern     Service No    Caffeine Concern Yes     Comment: 1 coffee daily, soda occasionally    Exercise Yes     Comment: Walks daily    Seat Belt Yes     Social Drivers of Health     Food Insecurity: No Food Insecurity (4/24/2025)    Received from [facility]    Food Insecurity     Currently or in the past 3 months, have you worried your food would run out before you had money to buy more?: No     In the past 12 months, have you run out  of food or been unable to get more?: No   Transportation Needs: No Transportation Needs (4/24/2025)    Received from [facility]    Transportation Needs     Currently or in the past 3 months, has lack of transportation kept you from medical appointments, getting food or medicine, or providing care to a family member?: No    Received from [facility]    Housing Stability              Review of Systems    Positive for stated complaint: left leg cellulitis  Other systems are as noted in HPI.  Constitutional and vital signs reviewed.      All other systems reviewed and negative except as noted above.    Physical Exam     ED Triage Vitals [04/25/25 1101]   /65   Pulse 90   Resp 16   Temp 98.7 °F (37.1 °C)   Temp src Temporal   SpO2 96 %   O2 Device None (Room air)       Current:/77   Pulse 88   Temp 98.7 °F (37.1 °C) (Temporal)   Resp 16   Wt 120 kg   SpO2 98%   BMI 39.07 kg/m²     General:  Vitals as listed.  No acute distress   HEENT: Sclerae anicteric.  Conjunctivae show no pallor.  Oropharynx clear, mucous membranes moist   Lungs: good air exchange  Abdomen: BMI 39  Extremities: Left leg erythema, warmth, and swelling as pictured below.  Normal distal perfusion.  There are some scratches that he caused in the upper aspect of his anterior leg.  Neuro: Alert oriented and nonfocal      ED Course     Labs Reviewed   RAINBOW DRAW LAVENDER   RAINBOW DRAW LIGHT GREEN     US VENOUS DOPPLER LEG LEFT - DIAG IMG (CPT=93971)  Result Date: 4/25/2025  CONCLUSION:  No evidence of deep venous thrombosis in the left lower extremity.   LOCATION:  [id number]    Dictated by (CST): Cam Thompson MD on 4/25/2025 at 1:35 PM     Finalized by (CST): Cam Thompson MD on 4/25/2025 at 1:35 PM       ED COURSE and MDM     I reviewed prior external notes including results of venous Doppler of the left lower extremity on 4/5/2025 that was negative for DVT    Patient will take a course of  Bactrim with first dose being given in the emergency department    Advised to keep legs elevated, follow low-salt diet, and consider compression stockings.    I have discussed with the patient the results of testing, differential diagnosis, and treatment plan. They expressed clear understanding of these instructions and agrees to the plan provided.    Disposition and Plan     Clinical Impression:  1. Left leg cellulitis         Disposition:  Discharge  4/25/2025  1:57 pm    Follow-up:  Cody Gonzalez MD  [address] [place] [id number] [phone]    Schedule an appointment as soon as possible for a visit in 3 day(s)          Medications Prescribed:  Discharge Medication List as of 4/25/2025  1:58 PM        START taking these medications    Details   sulfamethoxazole-trimethoprim -160 MG Oral Tab per tablet Take 1 tablet by mouth 2 (two) times daily for 10 days., Normal, Disp-20 tablet, R-0

## 2025-04-25 NOTE — ED INITIAL ASSESSMENT (HPI)
States he was treated for cellulitis a couple of weeks to his left lower leg and improved after antibiotics but now swelling and redness again. No fever

## 2025-05-06 ENCOUNTER — PATIENT MESSAGE (OUTPATIENT)
Dept: FAMILY MEDICINE CLINIC | Facility: CLINIC | Age: 60
End: 2025-05-06

## 2025-05-06 NOTE — TELEPHONE ENCOUNTER
Left message for Alex to return a call to triage. has a same day appointment on 5/8/25 at 4:30p.    Please advise  routed to

## 2025-05-07 NOTE — TELEPHONE ENCOUNTER
Pt called back and scheduled with Dr. Gonzalez at 4:30 pm on 5/8/25. Please call back if you have any other concerns.

## 2025-05-08 ENCOUNTER — OFFICE VISIT (OUTPATIENT)
Dept: FAMILY MEDICINE CLINIC | Facility: CLINIC | Age: 60
End: 2025-05-08
Payer: COMMERCIAL

## 2025-05-08 VITALS
WEIGHT: 273 LBS | DIASTOLIC BLOOD PRESSURE: 82 MMHG | TEMPERATURE: 98 F | OXYGEN SATURATION: 97 % | SYSTOLIC BLOOD PRESSURE: 140 MMHG | HEART RATE: 85 BPM | BODY MASS INDEX: 40.43 KG/M2 | HEIGHT: 69 IN

## 2025-05-08 DIAGNOSIS — L03.116 CELLULITIS OF LEFT LEG: Primary | ICD-10-CM

## 2025-05-08 PROCEDURE — 99213 OFFICE O/P EST LOW 20 MIN: CPT | Performed by: FAMILY MEDICINE

## 2025-05-08 NOTE — PROGRESS NOTES
No chief complaint on file.     HPI:   Alex Biggs is a 60 year old male who presents to the office for leg swelling, cellulitis follow up.    Of note, has had recurring leg cellulitis.  Admitted in 2019, had another flareup in August 2024  Was seen this year April 5 in the [facility] emergency room.  At that time, symptoms have been present for about 2 weeks.  He went to a walk-in clinic and was given Keflex but symptoms did not improve.  In the emergency room, he was given 3 g bolus of Unasyn.  Ultrasound showed no DVT.  Blood work done was pretty uneventful  Patient given Augmentin to take twice a day for 10 days.  Discharged home from the emergency room.  The swelling did improve.  It had basically resolved, then recurred.  He returned back to the emergency room on 25 April.  He was advised to keep legs elevated, take a course of Bactrim twice a day for 10 days.  Has essentially completed antibiotics for cellulitis without MRSA concern, and for cellulitis with MRSA concern.          Heart -was able to see cardiology team on April 25, following up the palpitations he was having.  The heart team thought the palpitations were due to the albuterol use.  EKG at that time was normal, event monitor showed less than 1% PVCs and no other significant arrhythmias.      REVIEW OF SYSTEMS:   Pertinent items are noted in HPI.  EXAM:   /82 (BP Location: Right arm, Patient Position: Sitting, Cuff Size: large)   Pulse 85   Temp 97.5 °F (36.4 °C) (Oral)   Ht 5' 9\" (1.753 m)   Wt 273 lb (123.8 kg)   SpO2 97%   BMI 40.32 kg/m²     General appearance - alert, well appearing, and in no distress  Extremities -bilateral leg swelling, but left significantly more than the right.  Redness and warmth.  No focal abscess or drainage.  ASSESSMENT AND PLAN:     Alex Biggs was seen in the office today:  had no chief complaint listed for this encounter.    1. Cellulitis of left leg  Recurring cellulitis of the leg  Did  improve initially after Unasyn and Augmentin.  But returned.  The Bactrim was ineffective.  Will resume Augmentin again.  Take medicine for 14 days twice a day  Patient is aware that if this does not improve his symptoms, we may need to revert to outpatient IV antibiotics.  We were looking for this to go away and resolved.  If it does not improve, need IV antibiotics  If it gets worse, need IV antibiotics  If it gets better, then returns, need IV antibiotics.  - amoxicillin clavulanate 875-125 MG Oral Tab; Take 1 tablet by mouth 2 (two) times daily for 14 days.  Dispense: 28 tablet; Refill: 0          Cody Gonzalez M.D.   [facility] 3  05/08/25        Note to patient: The 21 Century Cures Act makes medical notes like these available to patients in the interest of transparency. However, be advised this is a medical document. It is intended as peer to peer communication. It is written in medical language and may contain abbreviations or verbiage that are unfamiliar. It may appear blunt or direct. Medical documents are intended to carry relevant information, facts as evident, and the clinical opinion of the practitioner.

## 2025-05-23 ENCOUNTER — TELEPHONE (OUTPATIENT)
Facility: CLINIC | Age: 60
End: 2025-05-23

## 2025-05-23 DIAGNOSIS — G47.30 SLEEP APNEA, UNSPECIFIED TYPE: ICD-10-CM

## 2025-05-23 DIAGNOSIS — I49.9 IRREGULAR HEART BEAT: ICD-10-CM

## 2025-05-23 DIAGNOSIS — E66.09 OBESITY DUE TO EXCESS CALORIES, UNSPECIFIED CLASS, UNSPECIFIED WHETHER SERIOUS COMORBIDITY PRESENT: ICD-10-CM

## 2025-05-23 DIAGNOSIS — I10 ESSENTIAL HYPERTENSION: Primary | ICD-10-CM

## 2025-05-23 NOTE — TELEPHONE ENCOUNTER
Patient has a titration scheduled, but Dr. Doan wants study switch to split night. New order entered.    [phone] (home) [phone] (work)

## 2025-05-27 ENCOUNTER — TELEPHONE (OUTPATIENT)
Dept: FAMILY MEDICINE CLINIC | Facility: CLINIC | Age: 60
End: 2025-05-27

## 2025-05-27 ENCOUNTER — TELEPHONE (OUTPATIENT)
Facility: CLINIC | Age: 60
End: 2025-05-27

## 2025-05-27 ENCOUNTER — TELEPHONE (OUTPATIENT)
Dept: SLEEP CENTER | Age: 60
End: 2025-05-27

## 2025-05-27 NOTE — TELEPHONE ENCOUNTER
Recurring leg cellulitis  Has been on coverage for non MRSA  Has been on coverage for MRSA  Sytmptoms/infection will improve for short courses, but hsa always returned, and is now persistent.     Might need IV antibiotics.  Can we call and see if we can set this up for Alex?

## 2025-05-27 NOTE — TELEPHONE ENCOUNTER
[facility] with Car[facility] at the [facility]. The IV Antibiotic Order form should be completed and faxed to the Cancer Center along with Alex's insurance card. They will contact insurance for approval. When obtained, they will contact the patient to schedule an appointment. Form placed in 's InBox.     Routed to

## 2025-05-27 NOTE — TELEPHONE ENCOUNTER
----- Message from Sasha GÓMEZ sent at 5/27/2025  8:40 AM CDT -----  Dr. Doan,This patient was denied for a PSG.  Please follow up with the patient.Thank you,Sasha Gillette

## 2025-05-27 NOTE — TELEPHONE ENCOUNTER
ordered Vancomycin IV 20mg/kg(123KG)=2,400mg BID for 5 days. Order Faxed to [facility]  [phone] and will be sent to scan. Notified Alex the process has been started for IV antibiotic approval and administration. Alex verbalized understanding.

## 2025-05-28 ENCOUNTER — TELEPHONE (OUTPATIENT)
Facility: CLINIC | Age: 60
End: 2025-05-28

## 2025-05-28 NOTE — TELEPHONE ENCOUNTER
----- Message from David Doan sent at 5/27/2025  6:43 PM CDT -----  Arrange auto CPAP at 5-20 cwp and obtain download data in 30 days David Doan MD  ----- Message -----  From: Sasha Gillette  Sent: 5/27/2025   8:42 AM CDT  To: David Doan MD; [facility]; [facility] P#    Dr. Doan,This patient was denied for a PSG.  Please follow up with the patient.Thank you,Sasha Gillette

## 2025-05-30 ENCOUNTER — TELEPHONE (OUTPATIENT)
Age: 60
End: 2025-05-30

## 2025-05-30 ENCOUNTER — TELEPHONE (OUTPATIENT)
Dept: FAMILY MEDICINE CLINIC | Facility: CLINIC | Age: 60
End: 2025-05-30

## 2025-05-30 PROBLEM — L03.116 CELLULITIS OF LEFT LEG: Status: ACTIVE | Noted: 2025-05-30

## 2025-05-30 NOTE — TELEPHONE ENCOUNTER
Christel from the cancer center called says pt will not be starting his daily vancomycin this weekend due to his chemo schedule. He will receive it Mon-Fri next wk.

## 2025-05-30 NOTE — TELEPHONE ENCOUNTER
Spoke with Dr. Gonzalez's office to inform them that the patient will be scheduled for vancomycin daily x5 starting on Monday, 6/2. Due to the length of infusion time, we cannot accommodate over the weekend.

## 2025-06-02 ENCOUNTER — NURSE ONLY (OUTPATIENT)
Age: 60
End: 2025-06-02
Attending: FAMILY MEDICINE
Payer: COMMERCIAL

## 2025-06-02 VITALS
BODY MASS INDEX: 41 KG/M2 | OXYGEN SATURATION: 97 % | HEART RATE: 67 BPM | TEMPERATURE: 98 F | DIASTOLIC BLOOD PRESSURE: 78 MMHG | SYSTOLIC BLOOD PRESSURE: 127 MMHG | WEIGHT: 278 LBS | RESPIRATION RATE: 18 BRPM

## 2025-06-02 DIAGNOSIS — L03.116 CELLULITIS OF LEFT LEG: Primary | ICD-10-CM

## 2025-06-02 NOTE — PROGRESS NOTES
Per Dr. Gonzalez - change to Daptomycin 500mg daily x 5 day, discontinue the Vancomycin orders.    \"Patient had recurrence on Augmentin and failed bactrim. Our antibiogram shows excellent coverage with linezolid for both MRSA and MSSA. No serotonergic agents on his home med list. Since IV is preferred, switch to dapto 500 mg daily x 5 days (that is ~4 mg/kg on actual body weight or ~5.4 mg/kg on adjusted body weight). (instead of Vancomycin) \"    Patient was informed of the change and expressed understanding. Patient will follow-up with the office for any additional questions or concerns.     If the patient needs further treatment, Linezolid 600 mg PO BID x 5 days would be a reasonable option as well.     Bee So, DilmaD

## 2025-06-02 NOTE — PROGRESS NOTES
Pt here for daptomycin . Pt denies any issues or concerns.      Ordering Provider: tayla Mensah Exp: 6/6/25     Pt tolerated infusion without difficulty or complaint. Reviewed next apt date/time: yes      Education Record  Learner:  Patient  Disease / Diagnosis: cellulitis   Barriers / Limitations:  None  Method:  Discussion  General Topics:  Plan of care reviewed  Outcome:  Shows understanding     Patient here for IV abx - ordering MD contacted by infusion pharmacists regarding orders. Patient switched from vancomycin to IV daptomycin. New orders received and patient tolerated without issue. Patient aware of next appointment and left in stable condition.

## 2025-06-03 ENCOUNTER — NURSE ONLY (OUTPATIENT)
Age: 60
End: 2025-06-03
Attending: FAMILY MEDICINE
Payer: COMMERCIAL

## 2025-06-03 VITALS
HEART RATE: 81 BPM | WEIGHT: 278.38 LBS | HEIGHT: 69.02 IN | BODY MASS INDEX: 41.23 KG/M2 | SYSTOLIC BLOOD PRESSURE: 119 MMHG | TEMPERATURE: 98 F | RESPIRATION RATE: 18 BRPM | DIASTOLIC BLOOD PRESSURE: 80 MMHG | OXYGEN SATURATION: 96 %

## 2025-06-03 DIAGNOSIS — L03.116 CELLULITIS OF LEFT LEG: Primary | ICD-10-CM

## 2025-06-03 NOTE — PROGRESS NOTES
Pt here for Dapto . Pt denies any issues or concerns.      Ordering Provider: dr bourne  Order Exp: 6-7-25     Pt tolerated infusion without difficulty or complaint. Reviewed next apt date/time: tomorrow      Education Record  Learner:  Patient  Disease / Diagnosis: cellulitis  Barriers / Limitations:  None  Method:  Brief focused  General Topics:  Plan of care reviewed  Outcome:  Shows understanding

## 2025-06-04 ENCOUNTER — NURSE ONLY (OUTPATIENT)
Age: 60
End: 2025-06-04
Attending: FAMILY MEDICINE
Payer: COMMERCIAL

## 2025-06-04 VITALS
HEART RATE: 70 BPM | SYSTOLIC BLOOD PRESSURE: 126 MMHG | BODY MASS INDEX: 41.38 KG/M2 | DIASTOLIC BLOOD PRESSURE: 78 MMHG | HEIGHT: 69.02 IN | OXYGEN SATURATION: 95 % | WEIGHT: 279.38 LBS | TEMPERATURE: 98 F | RESPIRATION RATE: 18 BRPM

## 2025-06-04 DIAGNOSIS — L03.116 CELLULITIS OF LEFT LEG: Primary | ICD-10-CM

## 2025-06-04 NOTE — PROGRESS NOTES
Pt here for Daptomycin . Pt denies any issues or concerns.      Ordering Provider: dr bourne  Order Exp: 6-7-25     Pt tolerated infusion without difficulty or complaint. Reviewed next apt date/time: tomorrow      Education Record  Learner:  Patient  Disease / Diagnosis: cellulitis  Barriers / Limitations:  None  Method:  Brief focused  General Topics:  Plan of care reviewed  Outcome:  Shows understanding

## 2025-06-05 ENCOUNTER — MED REC SCAN ONLY (OUTPATIENT)
Facility: CLINIC | Age: 60
End: 2025-06-05

## 2025-06-05 ENCOUNTER — NURSE ONLY (OUTPATIENT)
Age: 60
End: 2025-06-05
Attending: FAMILY MEDICINE
Payer: COMMERCIAL

## 2025-06-05 VITALS
TEMPERATURE: 98 F | RESPIRATION RATE: 18 BRPM | BODY MASS INDEX: 40.29 KG/M2 | WEIGHT: 272 LBS | OXYGEN SATURATION: 95 % | SYSTOLIC BLOOD PRESSURE: 120 MMHG | DIASTOLIC BLOOD PRESSURE: 79 MMHG | HEIGHT: 69.02 IN | HEART RATE: 73 BPM

## 2025-06-05 DIAGNOSIS — L03.116 CELLULITIS OF LEFT LEG: Primary | ICD-10-CM

## 2025-06-05 NOTE — PROGRESS NOTES
Pt here for Daptomycin . Pt denies any issues or concerns.      Ordering Provider: Dr Gonzalez  Order Exp: 6/7/25     Pt tolerated infusion without difficulty or complaint. Reviewed next apt date/time:  6/6 @ 9:30      Education Record  Learner:  Patient  Disease / Diagnosis: cellulitis  Barriers / Limitations:  None  Method:  Brief focused  General Topics:  Plan of care reviewed  Outcome:  Shows understanding      Pt discharged in stable condition.

## 2025-06-06 ENCOUNTER — NURSE ONLY (OUTPATIENT)
Age: 60
End: 2025-06-06
Attending: FAMILY MEDICINE
Payer: COMMERCIAL

## 2025-06-06 ENCOUNTER — TELEPHONE (OUTPATIENT)
Dept: FAMILY MEDICINE CLINIC | Facility: CLINIC | Age: 60
End: 2025-06-06

## 2025-06-06 VITALS
SYSTOLIC BLOOD PRESSURE: 104 MMHG | WEIGHT: 279.19 LBS | RESPIRATION RATE: 18 BRPM | TEMPERATURE: 97 F | OXYGEN SATURATION: 93 % | HEART RATE: 68 BPM | BODY MASS INDEX: 41.35 KG/M2 | HEIGHT: 69.02 IN | DIASTOLIC BLOOD PRESSURE: 70 MMHG

## 2025-06-06 DIAGNOSIS — L03.116 CELLULITIS OF LEFT LEG: Primary | ICD-10-CM

## 2025-06-06 NOTE — TELEPHONE ENCOUNTER
Ongoing cellulitis.  S/p daptomycin IV for 5 days  Patient and pharmacy team feel it is not improving significantly.  I need to see him on Monday.  End of day is fine, to add him on.  4:45pm, 5pm

## 2025-06-06 NOTE — PROGRESS NOTES
Pt here for IV daptomycin . Pt denies any issues or concerns.      Ordering Provider: Dr. Gonzalez  Order Exp: last dose completed today     Pt tolerated infusion without difficulty or complaint. Reviewed next apt date/time: N/A      Education Record  Learner:  Patient  Disease / Diagnosis: cellulitis  Barriers / Limitations:  None  Method:  Discussion  General Topics:  Medication, Side effects and symptom management, Plan of care reviewed, and Fall risk and prevention  Outcome:  Shows understanding

## 2025-06-06 NOTE — PROGRESS NOTES
Patient tolerated the Daptomycin well, his itching is better (pt started Zyrtec). When I spoke with him, he still feels the infection has not improved. He states that it hasn't gotten worse, but overall was expecting a little more improvement, which we were anticipating as well. I recommended the patient follow up with you today or Monday. He possibly would benefit from an ID consult at this time and possibly a follow-up course of oral antibiotics linezolid 600 mg PO BID x 5-10 days, if ID feels appropriate.

## 2025-06-09 ENCOUNTER — OFFICE VISIT (OUTPATIENT)
Dept: FAMILY MEDICINE CLINIC | Facility: CLINIC | Age: 60
End: 2025-06-09
Payer: COMMERCIAL

## 2025-06-09 VITALS
HEART RATE: 88 BPM | HEIGHT: 68.25 IN | BODY MASS INDEX: 41.98 KG/M2 | SYSTOLIC BLOOD PRESSURE: 96 MMHG | TEMPERATURE: 98 F | WEIGHT: 277 LBS | DIASTOLIC BLOOD PRESSURE: 70 MMHG | RESPIRATION RATE: 20 BRPM

## 2025-06-09 DIAGNOSIS — L03.116 CELLULITIS OF LEFT LEG: Primary | ICD-10-CM

## 2025-06-09 PROCEDURE — 99213 OFFICE O/P EST LOW 20 MIN: CPT | Performed by: FAMILY MEDICINE

## 2025-06-09 RX ORDER — FUROSEMIDE 20 MG/1
20 TABLET ORAL DAILY
Qty: 30 TABLET | Refills: 0 | Status: SHIPPED | OUTPATIENT
Start: 2025-06-09

## 2025-06-09 RX ORDER — BENAZEPRIL HYDROCHLORIDE 40 MG/1
40 TABLET ORAL DAILY
Qty: 30 TABLET | Refills: 0 | Status: SHIPPED | OUTPATIENT
Start: 2025-06-09

## 2025-06-09 NOTE — PROGRESS NOTES
Chief Complaint   Patient presents with    Cellulitis     Present L leg Knee to foot.      HPI:   Alex Biggs is a 60 year old male who presents to the office for cellulitis follow up.    From last visit (5/8/25) note:  Of note, has had recurring leg cellulitis.  Admitted in 2019, had another flareup in August 2024  Was seen this year April 5 in the [facility] emergency room.  At that time, symptoms have been present for about 2 weeks.  He went to a walk-in clinic and was given Keflex but symptoms did not improve.  In the emergency room, he was given 3 g bolus of Unasyn.  Ultrasound showed no DVT.  Blood work done was pretty uneventful  Patient given Augmentin to take twice a day for 10 days.  Discharged home from the emergency room.  The swelling did improve.  It had basically resolved, then recurred.  He returned back to the emergency room on 25 April.  He was advised to keep legs elevated, take a course of Bactrim twice a day for 10 days.  Has essentially completed antibiotics for cellulitis without MRSA concern, and for cellulitis with MRSA concern.    He completed a 5 day course of IV antibiotics.  Daptomycin.  Despite 5 days, did not seem to improve too much.  Itching a little better.  Pharmacy team consulted with patient on the last IV infusion day and recommended he be seen today, and consider another round of outpatient antibiotics and perhaps an ID consult.      Still has swelling in the L leg.  Keeps his legs propped up at night.    Swelling better in the AM, and accumulates during the day.   REVIEW OF SYSTEMS:   Pertinent items are noted in HPI.  EXAM:   BP 96/70   Pulse 88   Temp 97.7 °F (36.5 °C) (Oral)   Resp 20   Ht 5' 8.25\" (1.734 m)   Wt 277 lb (125.6 kg)   BMI 41.81 kg/m²     General appearance - alert, well appearing, and in no distress  Leg - some redness in patches on medial aspect and lateral aspect.  Excoriations seen on the lateral side.       ASSESSMENT AND PLAN:     Alex  Brigido Biggs was seen in the office today:  had concerns including Cellulitis (Present L leg Knee to foot.).    1. Cellulitis of left leg  S/p several months of antibiotics and interventions  S/ IV x 5 days.  Maybe a little better.  Less redness.  Swelling better in the AM.  Will hold off on additional antibiotics now.  Monitor.  If redness returns, consider linezolid 600mg PO BID 5-10 days.  Referral to ID team  Approaching swelling - stop amlodipine.  Double benazepril to 40mg, add lasix in the AM.   - furosemide 20 MG Oral Tab; Take 1 tablet (20 mg total) by mouth daily.  Dispense: 30 tablet; Refill: 0  - Infectious Disease Referral - In Network  - Benazepril HCl 40 MG Oral Tab; Take 1 tablet (40 mg total) by mouth daily.  Dispense: 30 tablet; Refill: 0        Cody Gonzalez M.D.   [facility] 3  06/09/25        Note to patient: The 21 Century Cures Act makes medical notes like these available to patients in the interest of transparency. However, be advised this is a medical document. It is intended as peer to peer communication. It is written in medical language and may contain abbreviations or verbiage that are unfamiliar. It may appear blunt or direct. Medical documents are intended to carry relevant information, facts as evident, and the clinical opinion of the practitioner.

## 2025-07-03 DIAGNOSIS — L03.116 CELLULITIS OF LEFT LEG: ICD-10-CM

## 2025-07-05 DIAGNOSIS — L03.116 CELLULITIS OF LEFT LEG: ICD-10-CM

## 2025-07-08 RX ORDER — FUROSEMIDE 20 MG/1
20 TABLET ORAL DAILY
Qty: 30 TABLET | Refills: 0 | OUTPATIENT
Start: 2025-07-08

## 2025-07-08 NOTE — TELEPHONE ENCOUNTER
Please review. Protocol Pass    Original rx written 30 with no refills.  Rx is pended, is refill appropriate?     Requesting 90 day supply

## 2025-07-08 NOTE — TELEPHONE ENCOUNTER
Might need to call and see how he is doing clinically.   Lasix started for 30 days to help the swelling.  Does he need to stay on it?

## 2025-07-09 RX ORDER — BENAZEPRIL HYDROCHLORIDE 40 MG/1
40 TABLET ORAL DAILY
Qty: 90 TABLET | Refills: 3 | Status: SHIPPED | OUTPATIENT
Start: 2025-07-09

## 2025-07-09 RX ORDER — FUROSEMIDE 20 MG/1
20 TABLET ORAL DAILY
Qty: 30 TABLET | Refills: 1 | Status: SHIPPED | OUTPATIENT
Start: 2025-07-09

## 2025-07-09 NOTE — TELEPHONE ENCOUNTER
Alex was placed on Lasix 20mg and Benazepril 40mg for 30 days. He finished both medications today and has no more. Alex had swelling from the knee down in both leg, the L leg more than R due to cellulitis.  The swelling has decreased but still present. He is voiding more, but expected due to the diuretic. In the morning the swelling is down but by evening it has returned; however less than previously. Alex is wondering if another 30 days not 90 days would be good? He will try to send a [employer] message picture of his legs today.    Routed to

## (undated) DIAGNOSIS — Z13.220 LIPID SCREENING: ICD-10-CM

## (undated) DIAGNOSIS — I10 ESSENTIAL HYPERTENSION: Primary | ICD-10-CM

## (undated) DIAGNOSIS — I10 ESSENTIAL HYPERTENSION: ICD-10-CM

## (undated) DIAGNOSIS — R97.20 ELEVATED PSA, LESS THAN 10 NG/ML: ICD-10-CM

## (undated) NOTE — MR AVS SNAPSHOT
[address]  [facility],  [address] [place] [facility] [phone]               Thank you for choosing us for your health care visit with Deedee Morel PA-C.   We are glad to serve you and happy to provide you with [facility] Dx:  Elevated PSA, less than 10 ng/ml [R97.20]          Reason for Today's Visit     Blood Pressure           Medical Issues Discussed Today     Essential hypertension    Elevated PSA, less than 10 ng/ml          Instructions and Information about Y your Zip Code and Date of Birth to complete the sign-up process. If you do not sign up before the expiration date, you must request a new code.     Your unique [employer] Access Code: [email or web address]  Expires: 7/9/2017  4:43 PM    If you have questions, you can ca

## (undated) NOTE — Clinical Note
· Obtain environmental allergy test from [facility] Dr. Phong Davidson at [place], [place]  · Check EKG

## (undated) NOTE — MR AVS SNAPSHOT
[address]  [facility],  [address] [facility] [phone]               Thank you for choosing us for your health care visit with Feng May PA-C.   We are glad to serve you and happy to provide you with [facility] Karri Osman INTERNAL [[id number] CUSTOM]  Order #:  [id number]                  **REFERRAL REQUEST**    Your physician has referred you to a specialist.  Your physician or the clinic staff will provide you with the phone number you should call to schedule your million cases of malaria in 100 countries in the world each year. Travelers to other countries are at risk for malaria. What causes malaria? The parasite that causes malaria is passed to people in bites from a certain type of mosquito.  It may also be spr · Use screens on windows and mosquito netting over beds. What are the possible complications of malaria? Malaria can have serious complications. These can include:  · Too few red blood cells (anemia). This can cause weakness and tiredness.   · Damage to i talk with your healthcare provider about which tests are best for you   Depression All men in this age group At routine exams   Type 2 diabetes or prediabetes All adults beginning at age 39 and adults without symptoms at any age who are overweight or obese Haemophilus influenzae Type B (HIB) Men at increased risk for infection – talk with your healthcare provider 1 to 3 doses   Influenza (flu) All men in this age group Once a year   Measles, mumps, rubella (MMR) Men in this age group through their late 46s w BP Pulse Temp Height Weight BMI    150/98 mmHg 76 98.7 °F (37.1 °C) (Oral) 68.5\" 256 lb 6.4 oz 38.41 kg/m2         Current Medications          This list is accurate as of: 5/10/17  4:55 PM.  Always use your most recent med list.                Marcia Lambert